# Patient Record
Sex: FEMALE | Race: BLACK OR AFRICAN AMERICAN | NOT HISPANIC OR LATINO | Employment: OTHER | URBAN - METROPOLITAN AREA
[De-identification: names, ages, dates, MRNs, and addresses within clinical notes are randomized per-mention and may not be internally consistent; named-entity substitution may affect disease eponyms.]

---

## 2017-07-27 ENCOUNTER — HISTORICAL (OUTPATIENT)
Dept: LAB | Facility: HOSPITAL | Age: 77
End: 2017-07-27

## 2017-07-31 ENCOUNTER — HISTORICAL (OUTPATIENT)
Dept: RADIOLOGY | Facility: HOSPITAL | Age: 77
End: 2017-07-31

## 2018-02-22 ENCOUNTER — HISTORICAL (OUTPATIENT)
Dept: LAB | Facility: HOSPITAL | Age: 78
End: 2018-02-22

## 2018-02-22 LAB
ABS NEUT (OLG): 5.8 X10(3)/MCL (ref 2.1–9.2)
ALBUMIN SERPL-MCNC: 3.6 GM/DL (ref 3.4–5)
ALBUMIN/GLOB SERPL: 0.9 RATIO (ref 1.1–2)
ALP SERPL-CCNC: 83 UNIT/L (ref 46–116)
ALT SERPL-CCNC: 12 UNIT/L (ref 12–78)
AST SERPL-CCNC: 13 UNIT/L (ref 15–37)
BASOPHILS NFR BLD AUTO: 0 % (ref 0–2)
BILIRUB SERPL-MCNC: 0.4 MG/DL (ref 0.2–1)
BILIRUBIN DIRECT+TOT PNL SERPL-MCNC: 0.11 MG/DL (ref 0–0.2)
BILIRUBIN DIRECT+TOT PNL SERPL-MCNC: 0.28 MG/DL (ref 0–0.8)
BUN SERPL-MCNC: 21.7 MG/DL (ref 7–18)
CALCIUM SERPL-MCNC: 9.3 MG/DL (ref 8.5–10.1)
CHLORIDE SERPL-SCNC: 103 MMOL/L (ref 98–107)
CHOLEST SERPL-MCNC: 156 MG/DL (ref 0–200)
CHOLEST/HDLC SERPL: 2.1 {RATIO} (ref 0–4)
CO2 SERPL-SCNC: 31.1 MMOL/L (ref 21–32)
CREAT SERPL-MCNC: 1 MG/DL (ref 0.6–1.3)
EOSINOPHIL # BLD AUTO: 0.2 X10(3)/MCL
EOSINOPHIL NFR BLD AUTO: 2 %
ERYTHROCYTE [DISTWIDTH] IN BLOOD BY AUTOMATED COUNT: 15.2 % (ref 11.5–17)
EST. AVERAGE GLUCOSE BLD GHB EST-MCNC: 128 MG/DL
GLOBULIN SER-MCNC: 4 GM/DL (ref 2.4–3.5)
GLUCOSE SERPL-MCNC: 89 MG/DL (ref 74–106)
HBA1C MFR BLD: 6.1 % (ref 4.5–6.2)
HCT VFR BLD AUTO: 32.4 % (ref 37–47)
HDLC SERPL-MCNC: 75 MG/DL (ref 40–60)
HGB BLD-MCNC: 10.4 GM/DL (ref 12–16)
LDLC SERPL CALC-MCNC: 72 MG/DL (ref 0–129)
LYMPHOCYTES # BLD AUTO: 2.3 X10(3)/MCL
LYMPHOCYTES NFR BLD AUTO: 25 % (ref 13–40)
MCH RBC QN AUTO: 29.6 PG (ref 27–31)
MCHC RBC AUTO-ENTMCNC: 32 GM/DL (ref 33–36)
MCV RBC AUTO: 92.3 FL (ref 80–94)
MONOCYTES # BLD AUTO: 0.8 X10(3)/MCL
MONOCYTES NFR BLD AUTO: 9 % (ref 2–11)
NEUTROPHILS # BLD AUTO: 5.8 X10(3)/MCL (ref 2.1–9.2)
NEUTROPHILS NFR BLD AUTO: 64 % (ref 47–80)
PLATELET # BLD AUTO: 274 X10(3)/MCL (ref 130–400)
PMV BLD AUTO: 8.9 FL (ref 7.4–10.4)
POTASSIUM SERPL-SCNC: 4.2 MMOL/L (ref 3.5–5.1)
PROT SERPL-MCNC: 7.6 GM/DL (ref 6.4–8.2)
RBC # BLD AUTO: 3.5 X10(6)/MCL (ref 4.2–5.4)
SODIUM SERPL-SCNC: 141 MMOL/L (ref 136–145)
TRIGL SERPL-MCNC: 47 MG/DL
VLDLC SERPL CALC-MCNC: 9 MG/DL
WBC # SPEC AUTO: 9.2 X10(3)/MCL (ref 4.5–11.5)

## 2018-08-22 ENCOUNTER — HISTORICAL (OUTPATIENT)
Dept: LAB | Facility: HOSPITAL | Age: 78
End: 2018-08-22

## 2018-08-22 LAB
ABS NEUT (OLG): 4.42 X10(3)/MCL (ref 2.1–9.2)
ALBUMIN SERPL-MCNC: 3.6 GM/DL (ref 3.4–5)
ALBUMIN/GLOB SERPL: 0.9 RATIO (ref 1.1–2)
ALP SERPL-CCNC: 73 UNIT/L (ref 46–116)
ALT SERPL-CCNC: 14 UNIT/L (ref 12–78)
APPEARANCE, UA: CLEAR
AST SERPL-CCNC: 13 UNIT/L (ref 15–37)
BACTERIA SPEC CULT: ABNORMAL
BASOPHILS # BLD AUTO: 0 X10(3)/MCL (ref 0–0.2)
BASOPHILS NFR BLD AUTO: 0 %
BILIRUB SERPL-MCNC: 0.2 MG/DL (ref 0.2–1)
BILIRUB UR QL STRIP: NEGATIVE
BILIRUBIN DIRECT+TOT PNL SERPL-MCNC: 0.07 MG/DL (ref 0–0.2)
BILIRUBIN DIRECT+TOT PNL SERPL-MCNC: 0.13 MG/DL (ref 0–0.8)
BUN SERPL-MCNC: 19.7 MG/DL (ref 7–18)
CALCIUM SERPL-MCNC: 8.6 MG/DL (ref 8.5–10.1)
CHLORIDE SERPL-SCNC: 105 MMOL/L (ref 98–107)
CO2 SERPL-SCNC: 23.1 MMOL/L (ref 21–32)
COLOR UR: YELLOW
CREAT SERPL-MCNC: 0.97 MG/DL (ref 0.6–1.3)
CREAT UR-MCNC: 68.9 MG/DL (ref 30–125)
EOSINOPHIL # BLD AUTO: 0.2 X10(3)/MCL (ref 0–0.9)
EOSINOPHIL NFR BLD AUTO: 3 %
ERYTHROCYTE [DISTWIDTH] IN BLOOD BY AUTOMATED COUNT: 13.8 % (ref 11.5–17)
EST. AVERAGE GLUCOSE BLD GHB EST-MCNC: 131 MG/DL
GLOBULIN SER-MCNC: 3.8 GM/DL (ref 2.4–3.5)
GLUCOSE (UA): NEGATIVE
GLUCOSE SERPL-MCNC: 82 MG/DL (ref 74–106)
HBA1C MFR BLD: 6.2 % (ref 4.5–6.2)
HCT VFR BLD AUTO: 31.8 % (ref 37–47)
HGB BLD-MCNC: 9.8 GM/DL (ref 12–16)
HGB UR QL STRIP: NEGATIVE
IMM GRANULOCYTES # BLD AUTO: 0.01 % (ref 0–0.02)
IMM GRANULOCYTES NFR BLD AUTO: 0.1 % (ref 0–0.43)
IRON SATN MFR SERPL: 15.6 % (ref 20–50)
IRON SERPL-MCNC: 42 MCG/DL (ref 50–175)
KETONES UR QL STRIP: NEGATIVE
LEUKOCYTE ESTERASE UR QL STRIP: ABNORMAL
LYMPHOCYTES # BLD AUTO: 2.6 X10(3)/MCL (ref 0.6–4.6)
LYMPHOCYTES NFR BLD AUTO: 32 %
MCH RBC QN AUTO: 29.3 PG (ref 27–31)
MCHC RBC AUTO-ENTMCNC: 30.8 GM/DL (ref 33–36)
MCV RBC AUTO: 95.2 FL (ref 80–94)
MICROALBUMIN UR-MCNC: 0.2 MG/DL (ref 0–3)
MICROALBUMIN/CREAT RATIO PNL UR: 2.9 MG/GM CR (ref 0–30)
MONOCYTES # BLD AUTO: 0.7 X10(3)/MCL (ref 0.1–1.3)
MONOCYTES NFR BLD AUTO: 9 %
NEUTROPHILS # BLD AUTO: 4.42 X10(3)/MCL (ref 1.4–7.9)
NEUTROPHILS NFR BLD AUTO: 55 %
NITRITE UR QL STRIP: NEGATIVE
PH UR STRIP: 5.5 [PH] (ref 5–9)
PLATELET # BLD AUTO: 286 X10(3)/MCL (ref 130–400)
PMV BLD AUTO: 10.6 FL (ref 9.4–12.4)
POTASSIUM SERPL-SCNC: 4.2 MMOL/L (ref 3.5–5.1)
PROT SERPL-MCNC: 7.4 GM/DL (ref 6.4–8.2)
PROT UR QL STRIP: NEGATIVE
RBC # BLD AUTO: 3.34 X10(6)/MCL (ref 4.2–5.4)
RBC #/AREA URNS HPF: ABNORMAL /[HPF]
SODIUM SERPL-SCNC: 143 MMOL/L (ref 136–145)
SP GR UR STRIP: 1.01 (ref 1–1.03)
SQUAMOUS EPITHELIAL, UA: ABNORMAL
TIBC SERPL-MCNC: 269 MCG/DL (ref 250–450)
TRANSFERRIN SERPL-MCNC: 210 MG/DL (ref 200–360)
UROBILINOGEN UR STRIP-ACNC: 0.2
WBC # SPEC AUTO: 8 X10(3)/MCL (ref 4.5–11.5)
WBC #/AREA URNS HPF: ABNORMAL /HPF

## 2019-01-09 ENCOUNTER — HISTORICAL (OUTPATIENT)
Dept: RADIOLOGY | Facility: HOSPITAL | Age: 79
End: 2019-01-09

## 2019-02-21 ENCOUNTER — HISTORICAL (OUTPATIENT)
Dept: RADIOLOGY | Facility: HOSPITAL | Age: 79
End: 2019-02-21

## 2020-02-05 ENCOUNTER — HISTORICAL (OUTPATIENT)
Dept: LAB | Facility: HOSPITAL | Age: 80
End: 2020-02-05

## 2022-04-07 ENCOUNTER — HISTORICAL (OUTPATIENT)
Dept: ADMINISTRATIVE | Facility: HOSPITAL | Age: 82
End: 2022-04-07

## 2022-04-23 VITALS
DIASTOLIC BLOOD PRESSURE: 60 MMHG | HEIGHT: 65 IN | BODY MASS INDEX: 26.99 KG/M2 | WEIGHT: 162 LBS | SYSTOLIC BLOOD PRESSURE: 138 MMHG

## 2022-08-30 ENCOUNTER — TELEPHONE (OUTPATIENT)
Dept: PRIMARY CARE CLINIC | Facility: CLINIC | Age: 82
End: 2022-08-30
Payer: MEDICARE

## 2022-08-30 RX ORDER — HYDRALAZINE HYDROCHLORIDE 50 MG/1
50 TABLET, FILM COATED ORAL 3 TIMES DAILY
COMMUNITY
Start: 2022-08-28 | End: 2022-10-18 | Stop reason: DRUGHIGH

## 2022-08-30 RX ORDER — CARVEDILOL 12.5 MG/1
12.5 TABLET ORAL 2 TIMES DAILY
COMMUNITY
Start: 2022-08-16 | End: 2022-10-18 | Stop reason: SDUPTHER

## 2022-08-30 RX ORDER — VALSARTAN AND HYDROCHLOROTHIAZIDE 160; 12.5 MG/1; MG/1
1 TABLET, FILM COATED ORAL DAILY
COMMUNITY
Start: 2022-08-16 | End: 2022-10-18 | Stop reason: SDUPTHER

## 2022-08-30 NOTE — TELEPHONE ENCOUNTER
----- Message from Valery Urias sent at 8/30/2022 11:43 AM CDT -----  Regarding: Med Advice  Type:  Needs Medical Advice    Who Called: pt  Symptoms (please be specific): fatigue, no energy at all ... different bp readings .. numbest in hands  How long has patient had these symptoms: 4 days   Pharmacy name and phone #:  [facility]   Would the patient rather a call back or a response via [facility]? Call back  Best Call Back Number: [phone]  Additional Information: pt wants medical advice about the bp medication (Valsartan) that she is on.. says the pharmacy was out of the one that she normally takes but says it's the same she is currently still taking just shaped different ... pt doesn't feel well after starting this one .. pt is concerned after being told she is dying from someone    Says her bp went from 108 to 145 and even as high as 199

## 2022-08-30 NOTE — TELEPHONE ENCOUNTER
They often change shape without it meaning anything other than there is a different . Unfortunately going from one generic to another can be a big difference since they are allowed to be 10 % less or 10% stronger and she may have gone from one that was stronger to one that is now weaker.   If BP is still high she could take an extra Coreg, she is not on the maximal dose of it.   Rest today, recheck BP tomorrow, if still an issue we will see if we can get the name brand with a PA.

## 2022-09-08 ENCOUNTER — TELEPHONE (OUTPATIENT)
Dept: PRIMARY CARE CLINIC | Facility: CLINIC | Age: 82
End: 2022-09-08
Payer: MEDICARE

## 2022-09-08 NOTE — TELEPHONE ENCOUNTER
----- Message from Valery Bridgett sent at 9/8/2022  3:27 PM CDT -----  Regarding: Call Back  Type:  Patient Returning Call    Who Called: pt  Who Left Message for Patient: for nurse  Does the patient know what this is regarding?: yes  Would the patient rather a call back or a response via [facility]? Call back  Best Call Back Number: [phone]  Additional Information: pt wants to know if she can take her medication in the morning, instead of at night.. if not she also wants to know why .. she says the medication keeps her in the bathroom and breaks her sleep continuously due to this .. she also wanted to let  know her bp is low around 118/69 .. also when she wakes up she doesn't feel like herself

## 2022-10-18 ENCOUNTER — OFFICE VISIT (OUTPATIENT)
Dept: PRIMARY CARE CLINIC | Facility: CLINIC | Age: 82
End: 2022-10-18
Payer: MEDICARE

## 2022-10-18 VITALS
WEIGHT: 145 LBS | SYSTOLIC BLOOD PRESSURE: 118 MMHG | DIASTOLIC BLOOD PRESSURE: 78 MMHG | BODY MASS INDEX: 24.13 KG/M2 | TEMPERATURE: 97 F | RESPIRATION RATE: 16 BRPM | OXYGEN SATURATION: 98 % | HEART RATE: 67 BPM

## 2022-10-18 DIAGNOSIS — I10 ESSENTIAL HYPERTENSION: ICD-10-CM

## 2022-10-18 DIAGNOSIS — D50.0 IRON DEFICIENCY ANEMIA DUE TO CHRONIC BLOOD LOSS: ICD-10-CM

## 2022-10-18 DIAGNOSIS — E11.9 WELL CONTROLLED TYPE 2 DIABETES MELLITUS: Primary | ICD-10-CM

## 2022-10-18 DIAGNOSIS — Z23 NEED FOR INFLUENZA VACCINATION: ICD-10-CM

## 2022-10-18 PROBLEM — G56.00 CARPAL TUNNEL SYNDROME: Status: ACTIVE | Noted: 2022-10-18

## 2022-10-18 PROBLEM — N17.9 ACUTE RENAL FAILURE: Status: ACTIVE | Noted: 2022-10-18

## 2022-10-18 PROBLEM — K57.90 DIVERTICULAR DISEASE: Status: ACTIVE | Noted: 2022-10-18

## 2022-10-18 PROBLEM — D53.9 ANEMIA, MACROCYTIC: Status: ACTIVE | Noted: 2022-10-18

## 2022-10-18 PROBLEM — I25.10 ATHEROSCLEROSIS OF NATIVE CORONARY ARTERY OF NATIVE HEART WITHOUT ANGINA PECTORIS: Status: ACTIVE | Noted: 2022-10-18

## 2022-10-18 PROBLEM — D50.9 IRON DEFICIENCY ANEMIA: Status: ACTIVE | Noted: 2022-10-18

## 2022-10-18 PROBLEM — G56.20 ULNAR NERVE ENTRAPMENT: Status: ACTIVE | Noted: 2022-10-18

## 2022-10-18 LAB
ANION GAP SERPL CALC-SCNC: 11 MEQ/L
BUN SERPL-MCNC: 16.8 MG/DL (ref 9.8–20.1)
CALCIUM SERPL-MCNC: 8.9 MG/DL (ref 8.4–10.2)
CHLORIDE SERPL-SCNC: 104 MMOL/L (ref 98–107)
CO2 SERPL-SCNC: 24 MMOL/L (ref 23–31)
CREAT SERPL-MCNC: 0.86 MG/DL (ref 0.55–1.02)
CREAT/UREA NIT SERPL: 20
GFR SERPLBLD CREATININE-BSD FMLA CKD-EPI: >60 MLS/MIN/1.73/M2
GLUCOSE SERPL-MCNC: 80 MG/DL (ref 82–115)
POTASSIUM SERPL-SCNC: 4.3 MMOL/L (ref 3.5–5.1)
SODIUM SERPL-SCNC: 139 MMOL/L (ref 136–145)

## 2022-10-18 PROCEDURE — 36415 COLL VENOUS BLD VENIPUNCTURE: CPT | Mod: ,,, | Performed by: INTERNAL MEDICINE

## 2022-10-18 PROCEDURE — 1126F AMNT PAIN NOTED NONE PRSNT: CPT | Mod: CPTII,,, | Performed by: INTERNAL MEDICINE

## 2022-10-18 PROCEDURE — 3078F DIAST BP <80 MM HG: CPT | Mod: CPTII,,, | Performed by: INTERNAL MEDICINE

## 2022-10-18 PROCEDURE — 90694 FLU VACCINE - QUADRIVALENT - ADJUVANTED: ICD-10-PCS | Mod: ,,, | Performed by: INTERNAL MEDICINE

## 2022-10-18 PROCEDURE — 80048 BASIC METABOLIC PNL TOTAL CA: CPT | Performed by: INTERNAL MEDICINE

## 2022-10-18 PROCEDURE — 99213 OFFICE O/P EST LOW 20 MIN: CPT | Mod: 25,,, | Performed by: INTERNAL MEDICINE

## 2022-10-18 PROCEDURE — G0008 ADMIN INFLUENZA VIRUS VAC: HCPCS | Mod: ,,, | Performed by: INTERNAL MEDICINE

## 2022-10-18 PROCEDURE — G0008 FLU VACCINE - QUADRIVALENT - ADJUVANTED: ICD-10-PCS | Mod: ,,, | Performed by: INTERNAL MEDICINE

## 2022-10-18 PROCEDURE — 1160F RVW MEDS BY RX/DR IN RCRD: CPT | Mod: CPTII,,, | Performed by: INTERNAL MEDICINE

## 2022-10-18 PROCEDURE — 3074F SYST BP LT 130 MM HG: CPT | Mod: CPTII,,, | Performed by: INTERNAL MEDICINE

## 2022-10-18 PROCEDURE — 1126F PR PAIN SEVERITY QUANTIFIED, NO PAIN PRESENT: ICD-10-PCS | Mod: CPTII,,, | Performed by: INTERNAL MEDICINE

## 2022-10-18 PROCEDURE — 36415 PR COLLECTION VENOUS BLOOD,VENIPUNCTURE: ICD-10-PCS | Mod: ,,, | Performed by: INTERNAL MEDICINE

## 2022-10-18 PROCEDURE — 90694 VACC AIIV4 NO PRSRV 0.5ML IM: CPT | Mod: ,,, | Performed by: INTERNAL MEDICINE

## 2022-10-18 PROCEDURE — 99213 PR OFFICE/OUTPT VISIT, EST, LEVL III, 20-29 MIN: ICD-10-PCS | Mod: 25,,, | Performed by: INTERNAL MEDICINE

## 2022-10-18 PROCEDURE — 36415 COLL VENOUS BLD VENIPUNCTURE: CPT | Performed by: INTERNAL MEDICINE

## 2022-10-18 PROCEDURE — 1159F MED LIST DOCD IN RCRD: CPT | Mod: CPTII,,, | Performed by: INTERNAL MEDICINE

## 2022-10-18 PROCEDURE — 1159F PR MEDICATION LIST DOCUMENTED IN MEDICAL RECORD: ICD-10-PCS | Mod: CPTII,,, | Performed by: INTERNAL MEDICINE

## 2022-10-18 PROCEDURE — 3074F PR MOST RECENT SYSTOLIC BLOOD PRESSURE < 130 MM HG: ICD-10-PCS | Mod: CPTII,,, | Performed by: INTERNAL MEDICINE

## 2022-10-18 PROCEDURE — 1160F PR REVIEW ALL MEDS BY PRESCRIBER/CLIN PHARMACIST DOCUMENTED: ICD-10-PCS | Mod: CPTII,,, | Performed by: INTERNAL MEDICINE

## 2022-10-18 PROCEDURE — 3078F PR MOST RECENT DIASTOLIC BLOOD PRESSURE < 80 MM HG: ICD-10-PCS | Mod: CPTII,,, | Performed by: INTERNAL MEDICINE

## 2022-10-18 RX ORDER — METFORMIN HYDROCHLORIDE 500 MG/1
500 TABLET ORAL 2 TIMES DAILY WITH MEALS
Qty: 180 TABLET | Refills: 1 | Status: SHIPPED | OUTPATIENT
Start: 2022-10-18 | End: 2023-04-18 | Stop reason: SDUPTHER

## 2022-10-18 RX ORDER — CLOPIDOGREL BISULFATE 75 MG/1
75 TABLET ORAL DAILY
Qty: 90 TABLET | Refills: 1 | Status: SHIPPED | OUTPATIENT
Start: 2022-10-18 | End: 2023-04-18 | Stop reason: SDUPTHER

## 2022-10-18 RX ORDER — HYDRALAZINE HYDROCHLORIDE 50 MG/1
50 TABLET, FILM COATED ORAL 3 TIMES DAILY
Start: 2022-10-18 | End: 2023-04-18

## 2022-10-18 RX ORDER — VALSARTAN AND HYDROCHLOROTHIAZIDE 160; 12.5 MG/1; MG/1
1 TABLET, FILM COATED ORAL DAILY
Qty: 90 TABLET | Refills: 1 | Status: SHIPPED | OUTPATIENT
Start: 2022-10-18 | End: 2022-12-07

## 2022-10-18 RX ORDER — HYDRALAZINE HYDROCHLORIDE 25 MG/1
25 TABLET, FILM COATED ORAL 3 TIMES DAILY
COMMUNITY
Start: 2022-08-31

## 2022-10-18 RX ORDER — CARVEDILOL 12.5 MG/1
12.5 TABLET ORAL 2 TIMES DAILY
Qty: 180 TABLET | Refills: 1 | Status: SHIPPED | OUTPATIENT
Start: 2022-10-18 | End: 2023-04-18 | Stop reason: SDUPTHER

## 2022-10-18 RX ORDER — AMLODIPINE BESYLATE 10 MG/1
10 TABLET ORAL DAILY
Qty: 90 TABLET | Refills: 1 | Status: SHIPPED | OUTPATIENT
Start: 2022-10-18 | End: 2023-04-18 | Stop reason: SDUPTHER

## 2022-10-18 RX ORDER — SIMVASTATIN 20 MG/1
20 TABLET, FILM COATED ORAL NIGHTLY
Qty: 90 TABLET | Refills: 1 | Status: SHIPPED | OUTPATIENT
Start: 2022-10-18 | End: 2023-04-18 | Stop reason: SDUPTHER

## 2022-10-18 NOTE — PROGRESS NOTES
Idania Phan MD   [address] [facility] [place] [address]     PATIENT NAME: Lynda Mendoza  : 1940  DATE: 10/18/22  MRN: [id number]      Billing Provider: Idania Phan MD  Level of Service: ND OFFICE/OUTPT VISIT, EST, LEVL III, 20-29 MIN  Patient PCP Information       Provider PCP Type    Idania Phan MD General            Reason for Visit / Chief Complaint: 6 months follow up       Update PCP  Update Chief Complaint         History of Present Illness / Problem Focused Workflow     Lynda Mendoza presents to the clinic with 6 months follow up     82 year old AAF followed for HTN, DM, CHF, iron deficiency and CAD. She is here for follow up. She is still walking but she isn't as active as she used to. She didn't do the camp at summer and she feels bad about not being of value to society.   DM brought logs, she has been doing good, occasionally goes over 200 but she does when she is off diet. Her foot exam and microalbumin were 22  She has still gotten issues with urinating all night.         Review of Systems     Review of Systems   Constitutional:  Positive for activity change. Negative for fatigue.   HENT: Negative.     Respiratory: Negative.     Cardiovascular: Negative.    Gastrointestinal: Negative.    Genitourinary:  Positive for frequency.        Worst at night, she goes every 2 hours.    Musculoskeletal:  Positive for arthralgias.   Skin: Negative.    Neurological: Negative.    Psychiatric/Behavioral:          Feels down that she didn't go do the camp this summer.  She didn't like how the people were doing this year.     Medical / Social / Family History     Past Medical History:   Diagnosis Date    Acute renal failure     CAD (coronary artery disease)     Diabetes mellitus, type 2     Diverticulosis     HTN (hypertension)     Iron deficiency     Macrocytic anemia     MI (myocardial infarction)        Past Surgical History:   Procedure Laterality Date    BREAST LUMPECTOMY Left     NASAL SEPTOPLASTY       SKIN TAG REMOVAL      SURGICAL REMOVAL OF NODULE OF VOCAL CORD         Social History  Ms. Mendoza      reports that she has never smoked. She has never used smokeless tobacco. She reports that she does not currently use alcohol. She reports that she does not use drugs.    Family History  Ms.'s Mendoza   family history includes Dementia in her sister; Heart disease in her father; Heart failure in her sister.    Medications and Allergies     Medications  Outpatient Medications Marked as Taking for the 10/18/22 encounter (Office Visit) with Idania Phan MD   Medication Sig Dispense Refill    hydrALAZINE (APRESOLINE) 25 MG tablet Take 25 mg by mouth 3 (three) times daily. Taking 75 mg tid      [DISCONTINUED] amLODIPine (NORVASC) 10 MG tablet TAKE ONE TABLET BY MOUTH EVERY DAY FOR BLOOD PRESSURE NORVASC GENERIC NRX IN FILE 4/28/22 30 tablet 8    [DISCONTINUED] carvediloL (COREG) 12.5 MG tablet Take 12.5 mg by mouth 2 (two) times daily.      [DISCONTINUED] clopidogreL (PLAVIX) 75 mg tablet TAKE ONE TABLET BY MOUTH EVERY DAY FOR BLOOD GENERIC PLAVIX 30 tablet 5    [DISCONTINUED] metFORMIN (GLUCOPHAGE) 500 MG tablet TAKE ONE TABLET BY MOUTH TWICE A DAY FOR DIABETES NRX IN FILE 4/28/22 60 tablet 5    [DISCONTINUED] simvastatin (ZOCOR) 20 MG tablet TAKE ONE TABLET BY MOUTH AT BEDTIME FOR CHOLESTEROL GENERIC ZOCOR NRX IN FILE 4/28/22 30 tablet 5    [DISCONTINUED] valsartan-hydrochlorothiazide (DIOVAN-HCT) 160-12.5 mg per tablet Take 1 tablet by mouth once daily.         Allergies  Review of patient's allergies indicates:   Allergen Reactions    Penicillins        Physical Examination     Vitals:    10/18/22 1153   BP: 118/78   Pulse:    Resp:    Temp:      Physical Exam  Vitals reviewed.   Constitutional:       Appearance: Normal appearance.   HENT:      Head: Normocephalic and atraumatic.      Mouth/Throat:      Mouth: Mucous membranes are moist.      Pharynx: No oropharyngeal exudate or posterior oropharyngeal erythema.    Eyes:      Extraocular Movements: Extraocular movements intact.   Cardiovascular:      Rate and Rhythm: Normal rate and regular rhythm.      Heart sounds: Murmur heard.     Gallop present.   Pulmonary:      Effort: Pulmonary effort is normal.      Breath sounds: Normal breath sounds.   Abdominal:      Palpations: Abdomen is soft.      Tenderness: There is no abdominal tenderness. There is no guarding.   Musculoskeletal:         General: No tenderness.   Skin:     General: Skin is warm and dry.   Neurological:      Mental Status: She is alert.   Psychiatric:         Mood and Affect: Mood normal.         Behavior: Behavior normal.         Thought Content: Thought content normal.         Judgment: Judgment normal.         Assessment and Plan (including Health Maintenance)      Problem List  Smart Bunker Mode  Document Outside HM   :    Plan:   DM good control, full lab due in April with foot exam  HTN she says she is taking hydralazine 75mg TID still [facility] It's not in her bottles but her BP is good so probably is.   CAD/CHF keep follow up with Dr Moe.   Flu shot today.      Health Maintenance Due   Topic Date Due    TETANUS VACCINE  Never done    DEXA Scan  Never done    Shingles Vaccine (1 of 2) Never done    COVID-19 Vaccine (3 - Booster for Pfizer series) 05/20/2021    Pneumococcal Vaccines (Age 65+) (2 - PPSV23 if available, else PCV20) 12/17/2021    Influenza Vaccine (1) Never done   Diabetes not showing, microalbumin and foot exam as well as A1C done in April did not cross.     Problem List Items Addressed This Visit          Cardiac/Vascular    Essential hypertension    Relevant Orders    Basic Metabolic Panel       Oncology    Iron deficiency anemia       Endocrine    Well controlled type 2 diabetes mellitus - Primary    Relevant Medications    metFORMIN (GLUCOPHAGE) 500 MG tablet    Other Relevant Orders    Basic Metabolic Panel    Hemoglobin A1C     Other Visit Diagnoses       Need for  influenza vaccination        Relevant Orders    Influenza (FLUAD) - Quadrivalent (Adjuvanted) *Preferred* (65+) (PF)            Health Maintenance Topics with due status: Not Due       Topic Last Completion Date    Lipid Panel 04/28/2022       No future appointments. 6mo annual           Signature:  Idania Phan MD  [facility]  [id number] [address]    Date of encounter: 10/18/22

## 2022-10-25 ENCOUNTER — TELEPHONE (OUTPATIENT)
Dept: PRIMARY CARE CLINIC | Facility: CLINIC | Age: 82
End: 2022-10-25
Payer: MEDICARE

## 2022-10-25 NOTE — TELEPHONE ENCOUNTER
----- Message from Idania Phan MD sent at 10/19/2022  7:50 PM CDT -----  Sugar, kidney and electrolytes look great, next time we'll send her to the hospital to draw the blood so we get the A1C.

## 2022-12-07 DIAGNOSIS — D50.0 IRON DEFICIENCY ANEMIA DUE TO CHRONIC BLOOD LOSS: Primary | ICD-10-CM

## 2022-12-07 RX ORDER — VALSARTAN AND HYDROCHLOROTHIAZIDE 160; 12.5 MG/1; MG/1
TABLET, FILM COATED ORAL
Qty: 30 TABLET | Refills: 5 | Status: SHIPPED | OUTPATIENT
Start: 2022-12-07 | End: 2023-04-18 | Stop reason: DRUGHIGH

## 2023-01-23 PROBLEM — N17.9 ACUTE RENAL FAILURE: Status: RESOLVED | Noted: 2022-10-18 | Resolved: 2023-01-23

## 2023-02-14 ENCOUNTER — TELEPHONE (OUTPATIENT)
Dept: PRIMARY CARE CLINIC | Facility: CLINIC | Age: 83
End: 2023-02-14
Payer: MEDICARE

## 2023-02-14 NOTE — TELEPHONE ENCOUNTER
----- Message from Minna London sent at 2/14/2023 10:53 AM CST -----  Regarding: thank you!!!!!  Type:  Needs Medical Advice    Who Called: pt  Additional Information:   Patient called to Thank Dr Phan for her professional care all these years.  She also said Thank you to all the employees in the clinic and wishes everyone a Happy Carrillo's Day!

## 2023-04-04 ENCOUNTER — TELEPHONE (OUTPATIENT)
Dept: PRIMARY CARE CLINIC | Facility: CLINIC | Age: 83
End: 2023-04-04
Payer: MEDICARE

## 2023-04-17 PROBLEM — I77.9 DISORDER OF ARTERIES AND ARTERIOLES, UNSPECIFIED: Status: ACTIVE | Noted: 2023-04-17

## 2023-04-17 NOTE — PROGRESS NOTES
Patient ID: [id number]     Chief Complaint: No chief complaint on file.      HPI:     Lynda Mendoza is a 82 y.o. female here today for a Medicare Wellness.       Opioid Screening: Patient medication list reviewed, patient {IS / IS NOT:86650} taking prescription opioids. Patient {IS / IS NOT:92831} using additional opioids than prescribed. Patient {IS / IS NOT:51375} at low risk of substance abuse based on this opioid use history.       Past Medical History:   Diagnosis Date    Acute renal failure     CAD (coronary artery disease)     Diabetes mellitus, type 2     Diverticulosis     HTN (hypertension)     Iron deficiency     Macrocytic anemia     MI (myocardial infarction)         Past Surgical History:   Procedure Laterality Date    BREAST LUMPECTOMY Left     NASAL SEPTOPLASTY      SKIN TAG REMOVAL      SURGICAL REMOVAL OF NODULE OF VOCAL CORD         Review of patient's allergies indicates:   Allergen Reactions    Penicillins        No outpatient medications have been marked as taking for the 4/18/23 encounter (Appointment) with Idania Phan MD.       Social History     Socioeconomic History    Marital status:    Tobacco Use    Smoking status: Never    Smokeless tobacco: Never   Substance and Sexual Activity    Alcohol use: Not Currently    Drug use: Never        Family History   Problem Relation Age of Onset    Heart disease Father     Dementia Sister     Heart failure Sister         Patient Care Team:  Idania Phan MD as PCP - General (Internal Medicine)       Subjective:     ROS      Patient Reported Health Risk Assessment       Objective:     There were no vitals taken for this visit.    Physical Exam      No flowsheet data found.  No flowsheet data found.           Office Visit on 10/18/2022   Component Date Value    Sodium Level 10/18/2022 139     Potassium Level 10/18/2022 4.3     Chloride 10/18/2022 104     Carbon Dioxide 10/18/2022 24     Glucose Level 10/18/2022 80 (L)     Blood Urea  Nitrogen 10/18/2022 16.8     Creatinine 10/18/2022 0.86     BUN/Creatinine Ratio 10/18/2022 20     Calcium Level Total 10/18/2022 8.9     Anion Gap 10/18/2022 11.0     eGFR 10/18/2022 >60      Lab Results   Component Value Date    HGBA1C 6.2 08/22/2018     A1C in [facility] 4/28/22 was 6.6  Assessment/Plan:     1. Medicare annual wellness visit, subsequent    2. Essential hypertension    3. Well controlled type 2 diabetes mellitus    4. Anemia, macrocytic    5. Iron deficiency anemia due to chronic blood loss    6. Disorder of arteries and arterioles, unspecified           Medicare Annual Wellness and Personalized Prevention Plan:   Fall Risk + Home Safety + Hearing Impairment + Depression Screen + Opioid and Substance Abuse Screening + Cognitive Impairment Screen + Health Risk Assessment all reviewed.     Health Maintenance Topics with due status: Not Due       Topic Last Completion Date    Lipid Panel 04/28/2022      The patient's Health Maintenance was reviewed and the following appears to be due at this time:   Health Maintenance Due   Topic Date Due    TETANUS VACCINE  Never done    DEXA Scan  Never done    Shingles Vaccine (1 of 2) Never done    COVID-19 Vaccine (3 - Booster for Pfizer series) 05/20/2021    Pneumococcal Vaccines (Age 65+) (2 - PPSV23 if available, else PCV20) 12/17/2021       Advance Care Planning   I attest to discussing Advance Care Planning with patient and/or family member.  Education was provided including the importance of the Health Care Power of , Advance Directives, and/or [place] documentation.  The patient expressed understanding to the importance of this information and discussion.         No follow-ups on file. In addition to their scheduled follow up, the patient has also been instructed to follow up on as needed basis.

## 2023-04-18 ENCOUNTER — OFFICE VISIT (OUTPATIENT)
Dept: PRIMARY CARE CLINIC | Facility: CLINIC | Age: 83
End: 2023-04-18
Payer: MEDICARE

## 2023-04-18 ENCOUNTER — TELEPHONE (OUTPATIENT)
Dept: PRIMARY CARE CLINIC | Facility: CLINIC | Age: 83
End: 2023-04-18

## 2023-04-18 VITALS
SYSTOLIC BLOOD PRESSURE: 130 MMHG | TEMPERATURE: 98 F | HEART RATE: 65 BPM | DIASTOLIC BLOOD PRESSURE: 66 MMHG | WEIGHT: 154 LBS | OXYGEN SATURATION: 97 % | BODY MASS INDEX: 25.66 KG/M2 | HEIGHT: 65 IN | RESPIRATION RATE: 16 BRPM

## 2023-04-18 DIAGNOSIS — I77.9 DISORDER OF ARTERIES AND ARTERIOLES, UNSPECIFIED: ICD-10-CM

## 2023-04-18 DIAGNOSIS — D50.0 IRON DEFICIENCY ANEMIA DUE TO CHRONIC BLOOD LOSS: ICD-10-CM

## 2023-04-18 DIAGNOSIS — R94.6 ABNORMAL THYROID FUNCTION TEST: ICD-10-CM

## 2023-04-18 DIAGNOSIS — Z86.73 HISTORY OF ARTERIAL ISCHEMIC STROKE: ICD-10-CM

## 2023-04-18 DIAGNOSIS — E11.9 WELL CONTROLLED TYPE 2 DIABETES MELLITUS: ICD-10-CM

## 2023-04-18 DIAGNOSIS — I10 ESSENTIAL HYPERTENSION: Primary | ICD-10-CM

## 2023-04-18 DIAGNOSIS — I65.22 STENOSIS OF LEFT CAROTID ARTERY: ICD-10-CM

## 2023-04-18 DIAGNOSIS — E11.9 WELL CONTROLLED TYPE 2 DIABETES MELLITUS: Primary | ICD-10-CM

## 2023-04-18 LAB
ALBUMIN SERPL-MCNC: 3.5 G/DL (ref 3.4–4.8)
ALBUMIN/GLOB SERPL: 1 RATIO (ref 1.1–2)
ALP SERPL-CCNC: 72 UNIT/L (ref 40–150)
ALT SERPL-CCNC: 10 UNIT/L (ref 0–55)
AST SERPL-CCNC: 22 UNIT/L (ref 5–34)
BASOPHILS # BLD AUTO: 0.07 X10(3)/MCL (ref 0–0.2)
BASOPHILS NFR BLD AUTO: 1.4 %
BILIRUBIN DIRECT+TOT PNL SERPL-MCNC: 0.3 MG/DL
BUN SERPL-MCNC: 24.3 MG/DL (ref 9.8–20.1)
CALCIUM SERPL-MCNC: 8.8 MG/DL (ref 8.4–10.2)
CHLORIDE SERPL-SCNC: 106 MMOL/L (ref 98–107)
CO2 SERPL-SCNC: 25 MMOL/L (ref 23–31)
CREAT SERPL-MCNC: 1.08 MG/DL (ref 0.55–1.02)
EOSINOPHIL # BLD AUTO: 0.12 X10(3)/MCL (ref 0–0.9)
EOSINOPHIL NFR BLD AUTO: 2.3 %
ERYTHROCYTE [DISTWIDTH] IN BLOOD BY AUTOMATED COUNT: 16.1 % (ref 11.5–17)
GFR SERPLBLD CREATININE-BSD FMLA CKD-EPI: 51 MLS/MIN/1.73/M2
GLOBULIN SER-MCNC: 3.6 GM/DL (ref 2.4–3.5)
GLUCOSE SERPL-MCNC: 119 MG/DL (ref 82–115)
HCT VFR BLD AUTO: 25.7 % (ref 37–47)
HGB BLD-MCNC: 7.6 G/DL (ref 12–16)
IMM GRANULOCYTES # BLD AUTO: 0.01 X10(3)/MCL (ref 0–0.04)
IMM GRANULOCYTES NFR BLD AUTO: 0.2 %
IRON SATN MFR SERPL: 16 % (ref 20–50)
IRON SERPL-MCNC: 47 UG/DL (ref 50–170)
LYMPHOCYTES # BLD AUTO: 1.31 X10(3)/MCL (ref 0.6–4.6)
LYMPHOCYTES NFR BLD AUTO: 25.5 %
MCH RBC QN AUTO: 29.8 PG (ref 27–31)
MCHC RBC AUTO-ENTMCNC: 29.6 G/DL (ref 33–36)
MCV RBC AUTO: 100.8 FL (ref 80–94)
MONOCYTES # BLD AUTO: 0.49 X10(3)/MCL (ref 0.1–1.3)
MONOCYTES NFR BLD AUTO: 9.5 %
NEUTROPHILS # BLD AUTO: 3.14 X10(3)/MCL (ref 2.1–9.2)
NEUTROPHILS NFR BLD AUTO: 61.1 %
PLATELET # BLD AUTO: 225 X10(3)/MCL (ref 130–400)
PMV BLD AUTO: 11.3 FL (ref 7.4–10.4)
POTASSIUM SERPL-SCNC: 4.3 MMOL/L (ref 3.5–5.1)
PROT SERPL-MCNC: 7.1 GM/DL (ref 5.8–7.6)
RBC # BLD AUTO: 2.55 X10(6)/MCL (ref 4.2–5.4)
SODIUM SERPL-SCNC: 141 MMOL/L (ref 136–145)
TIBC SERPL-MCNC: 242 UG/DL (ref 70–310)
TIBC SERPL-MCNC: 289 UG/DL (ref 250–450)
TRANSFERRIN SERPL-MCNC: 266 MG/DL
TSH SERPL-ACNC: 1.79 UIU/ML (ref 0.35–4.94)
WBC # SPEC AUTO: 5.1 X10(3)/MCL (ref 4.5–11.5)

## 2023-04-18 PROCEDURE — 1101F PT FALLS ASSESS-DOCD LE1/YR: CPT | Mod: CPTII,,, | Performed by: INTERNAL MEDICINE

## 2023-04-18 PROCEDURE — 99214 OFFICE O/P EST MOD 30 MIN: CPT | Mod: ,,, | Performed by: INTERNAL MEDICINE

## 2023-04-18 PROCEDURE — 3078F PR MOST RECENT DIASTOLIC BLOOD PRESSURE < 80 MM HG: ICD-10-PCS | Mod: CPTII,,, | Performed by: INTERNAL MEDICINE

## 2023-04-18 PROCEDURE — 99214 PR OFFICE/OUTPT VISIT, EST, LEVL IV, 30-39 MIN: ICD-10-PCS | Mod: ,,, | Performed by: INTERNAL MEDICINE

## 2023-04-18 PROCEDURE — 80053 COMPREHEN METABOLIC PANEL: CPT | Performed by: INTERNAL MEDICINE

## 2023-04-18 PROCEDURE — 83550 IRON BINDING TEST: CPT | Performed by: INTERNAL MEDICINE

## 2023-04-18 PROCEDURE — 1101F PR PT FALLS ASSESS DOC 0-1 FALLS W/OUT INJ PAST YR: ICD-10-PCS | Mod: CPTII,,, | Performed by: INTERNAL MEDICINE

## 2023-04-18 PROCEDURE — 1126F AMNT PAIN NOTED NONE PRSNT: CPT | Mod: CPTII,,, | Performed by: INTERNAL MEDICINE

## 2023-04-18 PROCEDURE — 3078F DIAST BP <80 MM HG: CPT | Mod: CPTII,,, | Performed by: INTERNAL MEDICINE

## 2023-04-18 PROCEDURE — 84443 ASSAY THYROID STIM HORMONE: CPT | Performed by: INTERNAL MEDICINE

## 2023-04-18 PROCEDURE — 1126F PR PAIN SEVERITY QUANTIFIED, NO PAIN PRESENT: ICD-10-PCS | Mod: CPTII,,, | Performed by: INTERNAL MEDICINE

## 2023-04-18 PROCEDURE — 1160F PR REVIEW ALL MEDS BY PRESCRIBER/CLIN PHARMACIST DOCUMENTED: ICD-10-PCS | Mod: CPTII,,, | Performed by: INTERNAL MEDICINE

## 2023-04-18 PROCEDURE — 1160F RVW MEDS BY RX/DR IN RCRD: CPT | Mod: CPTII,,, | Performed by: INTERNAL MEDICINE

## 2023-04-18 PROCEDURE — 3075F PR MOST RECENT SYSTOLIC BLOOD PRESS GE 130-139MM HG: ICD-10-PCS | Mod: CPTII,,, | Performed by: INTERNAL MEDICINE

## 2023-04-18 PROCEDURE — 85025 COMPLETE CBC W/AUTO DIFF WBC: CPT | Performed by: INTERNAL MEDICINE

## 2023-04-18 PROCEDURE — 3075F SYST BP GE 130 - 139MM HG: CPT | Mod: CPTII,,, | Performed by: INTERNAL MEDICINE

## 2023-04-18 PROCEDURE — 1159F MED LIST DOCD IN RCRD: CPT | Mod: CPTII,,, | Performed by: INTERNAL MEDICINE

## 2023-04-18 PROCEDURE — 36415 PR COLLECTION VENOUS BLOOD,VENIPUNCTURE: ICD-10-PCS | Mod: ,,, | Performed by: INTERNAL MEDICINE

## 2023-04-18 PROCEDURE — 36415 COLL VENOUS BLD VENIPUNCTURE: CPT | Performed by: INTERNAL MEDICINE

## 2023-04-18 PROCEDURE — 3288F FALL RISK ASSESSMENT DOCD: CPT | Mod: CPTII,,, | Performed by: INTERNAL MEDICINE

## 2023-04-18 PROCEDURE — 3288F PR FALLS RISK ASSESSMENT DOCUMENTED: ICD-10-PCS | Mod: CPTII,,, | Performed by: INTERNAL MEDICINE

## 2023-04-18 PROCEDURE — 36415 COLL VENOUS BLD VENIPUNCTURE: CPT | Mod: ,,, | Performed by: INTERNAL MEDICINE

## 2023-04-18 PROCEDURE — 1159F PR MEDICATION LIST DOCUMENTED IN MEDICAL RECORD: ICD-10-PCS | Mod: CPTII,,, | Performed by: INTERNAL MEDICINE

## 2023-04-18 RX ORDER — AMLODIPINE BESYLATE 10 MG/1
10 TABLET ORAL DAILY
Qty: 90 TABLET | Refills: 1 | Status: SHIPPED | OUTPATIENT
Start: 2023-04-18 | End: 2023-10-31

## 2023-04-18 RX ORDER — METFORMIN HYDROCHLORIDE 500 MG/1
500 TABLET ORAL 2 TIMES DAILY WITH MEALS
Qty: 180 TABLET | Refills: 1 | Status: SHIPPED | OUTPATIENT
Start: 2023-04-18 | End: 2023-10-31

## 2023-04-18 RX ORDER — SIMVASTATIN 20 MG/1
20 TABLET, FILM COATED ORAL NIGHTLY
Qty: 90 TABLET | Refills: 1 | Status: SHIPPED | OUTPATIENT
Start: 2023-04-18 | End: 2023-10-31

## 2023-04-18 RX ORDER — VALSARTAN 160 MG/1
160 TABLET ORAL DAILY
Qty: 90 TABLET | Refills: 1 | Status: SHIPPED | OUTPATIENT
Start: 2023-04-18 | End: 2023-11-30

## 2023-04-18 RX ORDER — CLOPIDOGREL BISULFATE 75 MG/1
75 TABLET ORAL DAILY
Qty: 90 TABLET | Refills: 1 | Status: SHIPPED | OUTPATIENT
Start: 2023-04-18 | End: 2023-10-31

## 2023-04-18 RX ORDER — CARVEDILOL 12.5 MG/1
12.5 TABLET ORAL 2 TIMES DAILY
Qty: 180 TABLET | Refills: 1 | Status: SHIPPED | OUTPATIENT
Start: 2023-04-18 | End: 2023-10-31

## 2023-04-18 NOTE — TELEPHONE ENCOUNTER
Mr Bryant from [facility] called wanting to know about the valsartan 160 mg. He wanted to know if you were just filling it as valsartan 160 mg without the hctz component?

## 2023-04-18 NOTE — PROGRESS NOTES
Patient presents for lab draw. R AC, tolerated well. Labs sent to hospital.  Patient was a hard stick. Lab notified

## 2023-04-18 NOTE — PROGRESS NOTES
Idania Phan MD   [address] [address]     Patient ID: [id number]     Chief Complaint: Hypertension and Diabetes        HPI:     Lynda Mendoza is a 82 y.o. female here today for a follow up for HTN and DM. She notes that she is having trouble rising at six in the morning when she takes her Valsartan at night. She just doesn't have the energy she used to. She is not driving now so she feels she has to impose on others to get her around although she is paying them. No other complaints today.       Subjective:     Review of Systems   Constitutional:  Positive for malaise/fatigue.   Respiratory: Negative.     Cardiovascular:         Hasn't been back to Dr Moe, she doesn't like to go to [place] and feels I can take care of his medications. BP are lower at home than here.    Gastrointestinal: Negative.    Musculoskeletal:         Still walking just not as far, it takes longer now to reach her step goals.    Skin: Negative.    Neurological: Negative.    Endo/Heme/Allergies:         Sugars are good, she brought her readings.    Psychiatric/Behavioral: Negative.       Past Medical History:   Diagnosis Date    Acute renal failure     CAD (coronary artery disease)     Diabetes mellitus, type 2     Diverticulosis     HTN (hypertension)     Iron deficiency     Macrocytic anemia     MI (myocardial infarction)         Past Surgical History:   Procedure Laterality Date    BREAST LUMPECTOMY Left     NASAL SEPTOPLASTY      SKIN TAG REMOVAL      SURGICAL REMOVAL OF NODULE OF VOCAL CORD         Family History   Problem Relation Age of Onset    Heart disease Father     Dementia Sister     Heart failure Sister       Her sister has Lupus now, I'll update  Social History     Socioeconomic History    Marital status:    Tobacco Use    Smoking status: Never    Smokeless tobacco: Never   Substance and Sexual Activity    Alcohol use: Not Currently    Drug use: Never     Social Determinants of Health     Financial Resource  Strain: Unknown    Difficulty of Paying Living Expenses: Patient refused   Food Insecurity: Unknown    Worried About Running Out of Food in the Last Year: Patient refused    Ran Out of Food in the Last Year: Patient refused   Transportation Needs: Unknown    Lack of Transportation (Medical): Patient refused    Lack of Transportation (Non-Medical): Patient refused   Physical Activity: Unknown    Days of Exercise per Week: Patient refused    Minutes of Exercise per Session: Patient refused   Stress: Unknown    Feeling of Stress : Patient refused   Social Connections: Unknown    Frequency of Communication with Friends and Family: Patient refused    Frequency of Social Gatherings with Friends and Family: Patient refused    Attends Quaker Services: Patient refused    Active Member of Clubs or Organizations: Patient refused    Attends Club or Organization Meetings: Patient refused    Marital Status: Patient refused   Housing Stability: Unknown    Unable to Pay for Housing in the Last Year: Patient refused    Unstable Housing in the Last Year: Patient refused   She doesn't like to share information but is not having issues with housing or food.     Review of patient's allergies indicates:   Allergen Reactions    Penicillins        Outpatient Medications Marked as Taking for the 4/18/23 encounter (Office Visit) with Idania Phan MD   Medication Sig Dispense Refill    [DISCONTINUED] amLODIPine (NORVASC) 10 MG tablet Take 1 tablet (10 mg total) by mouth once daily. 90 tablet 1    [DISCONTINUED] carvediloL (COREG) 12.5 MG tablet Take 1 tablet (12.5 mg total) by mouth 2 (two) times daily. 180 tablet 1    [DISCONTINUED] clopidogreL (PLAVIX) 75 mg tablet Take 1 tablet (75 mg total) by mouth once daily. 90 tablet 1    [DISCONTINUED] metFORMIN (GLUCOPHAGE) 500 MG tablet Take 1 tablet (500 mg total) by mouth 2 (two) times daily with meals. 180 tablet 1    [DISCONTINUED] simvastatin (ZOCOR) 20 MG tablet Take 1 tablet (20 mg  "total) by mouth every evening. 90 tablet 1    [DISCONTINUED] valsartan-hydrochlorothiazide (DIOVAN-HCT) 160-12.5 mg per tablet TAKE ONE TABLET BY MOUTH DAILY GENERIC DIOVAN/HCTZ NRX IN FILE 10/18/22 30 tablet 5       Patient Care Team:  Idania Phan MD as PCP - General (Internal Medicine)       Objective:     /66 (BP Location: Left arm, Patient Position: Sitting, BP Method: Medium (Automatic))   Pulse 65   Temp 98.3 °F (36.8 °C) (Oral)   Resp 16   Ht 5' 5" (1.651 m)   Wt 69.9 kg (154 lb)   SpO2 97%   BMI 25.63 kg/m²     Physical Exam  Vitals reviewed.   Cardiovascular:      Rate and Rhythm: Normal rate and regular rhythm.      Heart sounds:     No gallop.   Pulmonary:      Effort: Pulmonary effort is normal.      Breath sounds: Normal breath sounds. No rales.   Abdominal:      General: Bowel sounds are normal.      Palpations: Abdomen is soft.   Neurological:      Mental Status: She is alert.   Psychiatric:         Mood and Affect: Mood normal.         Behavior: Behavior normal.         Thought Content: Thought content normal.         Judgment: Judgment normal.      Comments: Her usual self, she refused to answer the questions for social determination.          Assessment/Problems:       ICD-10-CM ICD-9-CM   1. Essential hypertension  I10 401.9   2. Well controlled type 2 diabetes mellitus  E11.9 250.00   3. Iron deficiency anemia due to chronic blood loss  D50.0 280.0   4. Disorder of arteries and arterioles, unspecified  I77.9 447.9   5. Abnormal thyroid function test  R94.6 794.5   6. Stenosis of left carotid artery  I65.22 433.10   7. History of arterial ischemic stroke  Z86.73 V12.54        Plan:     1. Essential hypertension  Comments:  Good today, even better at home, we'll try taking off the diuretic and see if she gets better energy  Orders:  -     Comprehensive Metabolic Panel    2. Well controlled type 2 diabetes mellitus  Comments:  A1C has been excellent, will try to get her to let us do " lab but she hates sticks.   Orders:  -     Comprehensive Metabolic Panel  -     Hemoglobin A1C    3. Iron deficiency anemia due to chronic blood loss  -     CBC Auto Differential  -     Iron and TIBC    4. Disorder of arteries and arterioles, unspecified  Comments:  She is not going back to [place] and we'll check.     5. Abnormal thyroid function test  Comments:  She was on thyroid in past and fatigue will recheck  Orders:  -     TSH    6. Stenosis of left carotid artery  Comments:  If not going to see Dr Moe I'll need to monitor.   Orders:  -     US Carotid Bilateral; Future; Expected date: 04/18/2023    7. History of arterial ischemic stroke  Comments:  Continue Plavix  Orders:  -     US Carotid Bilateral; Future; Expected date: 04/18/2023    Other orders  -     valsartan (DIOVAN) 160 MG tablet; Take 1 tablet (160 mg total) by mouth once daily.  Dispense: 90 tablet; Refill: 1  -     metFORMIN (GLUCOPHAGE) 500 MG tablet; Take 1 tablet (500 mg total) by mouth 2 (two) times daily with meals.  Dispense: 180 tablet; Refill: 1  -     simvastatin (ZOCOR) 20 MG tablet; Take 1 tablet (20 mg total) by mouth every evening.  Dispense: 90 tablet; Refill: 1  -     clopidogreL (PLAVIX) 75 mg tablet; Take 1 tablet (75 mg total) by mouth once daily.  Dispense: 90 tablet; Refill: 1  -     carvediloL (COREG) 12.5 MG tablet; Take 1 tablet (12.5 mg total) by mouth 2 (two) times daily.  Dispense: 180 tablet; Refill: 1  -     amLODIPine (NORVASC) 10 MG tablet; Take 1 tablet (10 mg total) by mouth once daily.  Dispense: 90 tablet; Refill: 1             Follow up in about 3 months (around 7/18/2023) for Wellness. In addition to their scheduled follow up, the patient has also been instructed to follow up on as needed basis.     Signature:  Idania Phan MD  [facility]  [id number] [address]

## 2023-04-19 ENCOUNTER — TELEPHONE (OUTPATIENT)
Dept: PRIMARY CARE CLINIC | Facility: CLINIC | Age: 83
End: 2023-04-19
Payer: MEDICARE

## 2023-04-19 DIAGNOSIS — E11.9 WELL CONTROLLED TYPE 2 DIABETES MELLITUS: Primary | ICD-10-CM

## 2023-04-19 DIAGNOSIS — D50.9 HYPOCHROMIC ANEMIA: Primary | ICD-10-CM

## 2023-04-19 NOTE — TELEPHONE ENCOUNTER
Yes looks like other labs where done.  [facility]. Voiced understanding

## 2023-04-27 ENCOUNTER — TELEPHONE (OUTPATIENT)
Dept: PRIMARY CARE CLINIC | Facility: CLINIC | Age: 83
End: 2023-04-27
Payer: MEDICARE

## 2023-04-27 NOTE — TELEPHONE ENCOUNTER
----- Message from Nitin Bradley MA sent at 4/27/2023  3:25 PM CDT -----  Regarding: FW: call back    ----- Message -----  From: Umm Velez  Sent: 4/27/2023   2:43 PM CDT  To: [facility] Staff  Subject: call back                                        .Type:  Needs Medical Advice    Who Called: hermelinda  Would the patient rather a call back or a response via [facility]?   Best Call Back Number: [phone]  Additional Information: pt is requesting a call back she has questions about Carotid Bilateral .

## 2023-05-09 ENCOUNTER — HOSPITAL ENCOUNTER (OUTPATIENT)
Dept: RADIOLOGY | Facility: HOSPITAL | Age: 83
Discharge: HOME | End: 2023-05-09
Attending: INTERNAL MEDICINE
Payer: MEDICARE

## 2023-05-09 DIAGNOSIS — Z86.73 HISTORY OF ARTERIAL ISCHEMIC STROKE: ICD-10-CM

## 2023-05-09 DIAGNOSIS — I65.22 STENOSIS OF LEFT CAROTID ARTERY: ICD-10-CM

## 2023-05-09 PROCEDURE — 93880 EXTRACRANIAL BILAT STUDY: CPT | Mod: TC

## 2023-06-08 ENCOUNTER — OFFICE VISIT (OUTPATIENT)
Dept: HEMATOLOGY/ONCOLOGY | Facility: CLINIC | Age: 83
End: 2023-06-08
Payer: MEDICARE

## 2023-06-08 VITALS
TEMPERATURE: 98 F | DIASTOLIC BLOOD PRESSURE: 87 MMHG | OXYGEN SATURATION: 98 % | WEIGHT: 147.81 LBS | HEIGHT: 65 IN | SYSTOLIC BLOOD PRESSURE: 163 MMHG | BODY MASS INDEX: 24.63 KG/M2 | HEART RATE: 74 BPM

## 2023-06-08 DIAGNOSIS — E61.1 IRON DEFICIENCY: ICD-10-CM

## 2023-06-08 DIAGNOSIS — D50.9 HYPOCHROMIC ANEMIA: ICD-10-CM

## 2023-06-08 DIAGNOSIS — D53.9 ANEMIA, MACROCYTIC: Primary | ICD-10-CM

## 2023-06-08 LAB
ALBUMIN SERPL-MCNC: 3.8 G/DL (ref 3.4–4.8)
ALBUMIN/GLOB SERPL: 1.1 RATIO (ref 1.1–2)
ALP SERPL-CCNC: 92 UNIT/L (ref 40–150)
ALT SERPL-CCNC: 10 UNIT/L (ref 0–55)
AST SERPL-CCNC: 16 UNIT/L (ref 5–34)
BASOPHILS # BLD AUTO: 0.04 X10(3)/MCL
BASOPHILS NFR BLD AUTO: 0.4 %
BILIRUBIN DIRECT+TOT PNL SERPL-MCNC: 0.4 MG/DL
BUN SERPL-MCNC: 25.4 MG/DL (ref 9.8–20.1)
CALCIUM SERPL-MCNC: 9.2 MG/DL (ref 8.4–10.2)
CHLORIDE SERPL-SCNC: 103 MMOL/L (ref 98–107)
CO2 SERPL-SCNC: 27 MMOL/L (ref 23–31)
CREAT SERPL-MCNC: 1.05 MG/DL (ref 0.55–1.02)
EOSINOPHIL # BLD AUTO: 0.26 X10(3)/MCL (ref 0–0.9)
EOSINOPHIL NFR BLD AUTO: 2.8 %
ERYTHROCYTE [DISTWIDTH] IN BLOOD BY AUTOMATED COUNT: 14.7 % (ref 11.5–17)
FERRITIN SERPL-MCNC: 29.86 NG/ML (ref 4.63–204)
FOLATE SERPL-MCNC: 18.4 NG/ML (ref 7–31.4)
GFR SERPLBLD CREATININE-BSD FMLA CKD-EPI: 53 MLS/MIN/1.73/M2
GLOBULIN SER-MCNC: 3.5 GM/DL (ref 2.4–3.5)
GLUCOSE SERPL-MCNC: 79 MG/DL (ref 82–115)
HCT VFR BLD AUTO: 33.3 % (ref 37–47)
HGB BLD-MCNC: 10 G/DL (ref 12–16)
IGA SERPL-MCNC: 279 MG/DL (ref 69–517)
IGG SERPL-MCNC: 1547 MG/DL (ref 522–1631)
IGM SERPL-MCNC: 31 MG/DL (ref 33–293)
IMM GRANULOCYTES # BLD AUTO: 0.01 X10(3)/MCL (ref 0–0.04)
IMM GRANULOCYTES NFR BLD AUTO: 0.1 %
IRON SATN MFR SERPL: 10 % (ref 20–50)
IRON SERPL-MCNC: 29 UG/DL (ref 50–170)
LYMPHOCYTES # BLD AUTO: 2.08 X10(3)/MCL (ref 0.6–4.6)
LYMPHOCYTES NFR BLD AUTO: 22.2 %
MCH RBC QN AUTO: 28.6 PG (ref 27–31)
MCHC RBC AUTO-ENTMCNC: 30 G/DL (ref 33–36)
MCV RBC AUTO: 95.1 FL (ref 80–94)
MONOCYTES # BLD AUTO: 0.9 X10(3)/MCL (ref 0.1–1.3)
MONOCYTES NFR BLD AUTO: 9.6 %
NEUTROPHILS # BLD AUTO: 6.06 X10(3)/MCL (ref 2.1–9.2)
NEUTROPHILS NFR BLD AUTO: 64.9 %
PLATELET # BLD AUTO: 297 X10(3)/MCL (ref 130–400)
PMV BLD AUTO: 9.4 FL (ref 7.4–10.4)
POTASSIUM SERPL-SCNC: 4.1 MMOL/L (ref 3.5–5.1)
PROT SERPL-MCNC: 7.3 GM/DL (ref 5.8–7.6)
RBC # BLD AUTO: 3.5 X10(6)/MCL (ref 4.2–5.4)
RET# (OHS): 0.04 (ref 0.02–0.08)
RETICULOCYTE COUNT AUTOMATED (OLG): 1.12 % (ref 1.1–2.1)
SODIUM SERPL-SCNC: 139 MMOL/L (ref 136–145)
TIBC SERPL-MCNC: 248 UG/DL (ref 70–310)
TIBC SERPL-MCNC: 277 UG/DL (ref 250–450)
TRANSFERRIN SERPL-MCNC: 249 MG/DL
VIT B12 SERPL-MCNC: 315 PG/ML (ref 213–816)
WBC # SPEC AUTO: 9.35 X10(3)/MCL (ref 4.5–11.5)

## 2023-06-08 PROCEDURE — 99999 PR PBB SHADOW E&M-EST. PATIENT-LVL IV: CPT | Mod: PBBFAC,,, | Performed by: INTERNAL MEDICINE

## 2023-06-08 PROCEDURE — 86225 DNA ANTIBODY NATIVE: CPT | Performed by: INTERNAL MEDICINE

## 2023-06-08 PROCEDURE — 80053 COMPREHEN METABOLIC PANEL: CPT | Performed by: INTERNAL MEDICINE

## 2023-06-08 PROCEDURE — 86235 NUCLEAR ANTIGEN ANTIBODY: CPT | Performed by: INTERNAL MEDICINE

## 2023-06-08 PROCEDURE — 82525 ASSAY OF COPPER: CPT | Performed by: INTERNAL MEDICINE

## 2023-06-08 PROCEDURE — 1159F PR MEDICATION LIST DOCUMENTED IN MEDICAL RECORD: ICD-10-PCS | Mod: CPTII,S$GLB,, | Performed by: INTERNAL MEDICINE

## 2023-06-08 PROCEDURE — 1101F PR PT FALLS ASSESS DOC 0-1 FALLS W/OUT INJ PAST YR: ICD-10-PCS | Mod: CPTII,S$GLB,, | Performed by: INTERNAL MEDICINE

## 2023-06-08 PROCEDURE — 84165 PROTEIN E-PHORESIS SERUM: CPT | Performed by: INTERNAL MEDICINE

## 2023-06-08 PROCEDURE — 83550 IRON BINDING TEST: CPT | Performed by: INTERNAL MEDICINE

## 2023-06-08 PROCEDURE — 3077F SYST BP >= 140 MM HG: CPT | Mod: CPTII,S$GLB,, | Performed by: INTERNAL MEDICINE

## 2023-06-08 PROCEDURE — 85045 AUTOMATED RETICULOCYTE COUNT: CPT | Performed by: INTERNAL MEDICINE

## 2023-06-08 PROCEDURE — 3288F FALL RISK ASSESSMENT DOCD: CPT | Mod: CPTII,S$GLB,, | Performed by: INTERNAL MEDICINE

## 2023-06-08 PROCEDURE — 1160F PR REVIEW ALL MEDS BY PRESCRIBER/CLIN PHARMACIST DOCUMENTED: ICD-10-PCS | Mod: CPTII,S$GLB,, | Performed by: INTERNAL MEDICINE

## 2023-06-08 PROCEDURE — 83521 IG LIGHT CHAINS FREE EACH: CPT | Mod: 59 | Performed by: INTERNAL MEDICINE

## 2023-06-08 PROCEDURE — 99204 OFFICE O/P NEW MOD 45 MIN: CPT | Mod: S$GLB,,, | Performed by: INTERNAL MEDICINE

## 2023-06-08 PROCEDURE — 3079F DIAST BP 80-89 MM HG: CPT | Mod: CPTII,S$GLB,, | Performed by: INTERNAL MEDICINE

## 2023-06-08 PROCEDURE — 1126F PR PAIN SEVERITY QUANTIFIED, NO PAIN PRESENT: ICD-10-PCS | Mod: CPTII,S$GLB,, | Performed by: INTERNAL MEDICINE

## 2023-06-08 PROCEDURE — 1160F RVW MEDS BY RX/DR IN RCRD: CPT | Mod: CPTII,S$GLB,, | Performed by: INTERNAL MEDICINE

## 2023-06-08 PROCEDURE — 82784 ASSAY IGA/IGD/IGG/IGM EACH: CPT | Performed by: INTERNAL MEDICINE

## 2023-06-08 PROCEDURE — 1126F AMNT PAIN NOTED NONE PRSNT: CPT | Mod: CPTII,S$GLB,, | Performed by: INTERNAL MEDICINE

## 2023-06-08 PROCEDURE — 1101F PT FALLS ASSESS-DOCD LE1/YR: CPT | Mod: CPTII,S$GLB,, | Performed by: INTERNAL MEDICINE

## 2023-06-08 PROCEDURE — 82728 ASSAY OF FERRITIN: CPT | Performed by: INTERNAL MEDICINE

## 2023-06-08 PROCEDURE — 82607 VITAMIN B-12: CPT | Performed by: INTERNAL MEDICINE

## 2023-06-08 PROCEDURE — 99204 PR OFFICE/OUTPT VISIT, NEW, LEVL IV, 45-59 MIN: ICD-10-PCS | Mod: S$GLB,,, | Performed by: INTERNAL MEDICINE

## 2023-06-08 PROCEDURE — 36415 COLL VENOUS BLD VENIPUNCTURE: CPT | Performed by: INTERNAL MEDICINE

## 2023-06-08 PROCEDURE — 1159F MED LIST DOCD IN RCRD: CPT | Mod: CPTII,S$GLB,, | Performed by: INTERNAL MEDICINE

## 2023-06-08 PROCEDURE — 3288F PR FALLS RISK ASSESSMENT DOCUMENTED: ICD-10-PCS | Mod: CPTII,S$GLB,, | Performed by: INTERNAL MEDICINE

## 2023-06-08 PROCEDURE — 85025 COMPLETE CBC W/AUTO DIFF WBC: CPT | Performed by: INTERNAL MEDICINE

## 2023-06-08 PROCEDURE — 82746 ASSAY OF FOLIC ACID SERUM: CPT | Performed by: INTERNAL MEDICINE

## 2023-06-08 PROCEDURE — 86334 IMMUNOFIX E-PHORESIS SERUM: CPT | Performed by: INTERNAL MEDICINE

## 2023-06-08 PROCEDURE — 3077F PR MOST RECENT SYSTOLIC BLOOD PRESSURE >= 140 MM HG: ICD-10-PCS | Mod: CPTII,S$GLB,, | Performed by: INTERNAL MEDICINE

## 2023-06-08 PROCEDURE — 99999 PR PBB SHADOW E&M-EST. PATIENT-LVL IV: ICD-10-PCS | Mod: PBBFAC,,, | Performed by: INTERNAL MEDICINE

## 2023-06-08 PROCEDURE — 85060 BLOOD SMEAR INTERPRETATION: CPT | Performed by: INTERNAL MEDICINE

## 2023-06-08 PROCEDURE — 3079F PR MOST RECENT DIASTOLIC BLOOD PRESSURE 80-89 MM HG: ICD-10-PCS | Mod: CPTII,S$GLB,, | Performed by: INTERNAL MEDICINE

## 2023-06-08 NOTE — PROGRESS NOTES
HEMATOLOGY/ONCOLOGY OFFICE CLINIC VISIT    Visit Information:    Initial Evaluation: 6/8/2023  Referring Provider:   Other providers:  Code status:    Diagnosis:  Anemia        CLINICAL HISTORY:       Patient: Lynda Mendoza is a 82 y.o. female kindly referred for anemia.    Reviewing EMR patient has been anemic since 1/2013. At the time anemia was mild with Hb around 11.3. Her hemoglobin slowly decreased but remains between 9.2-10.4 until recently when her Hb was 7.6.    Patient voices no concerns or all.  She denies any fever, chills, sweats.  No chest pain or short of breath.  No changes in bowel habits.  She reports that she exercise regularly and is a very active person.  She still do not understand how she can have anemia when she feels so good.  No family history of malignancies.    Chief Complaint: OTHER (NP referred by Dr. Idania Phan for Hypochromic Anemia. )      Interval History:        Past Medical History:   Diagnosis Date    Acute renal failure     CAD (coronary artery disease)     Diabetes mellitus, type 2     Diverticulosis     HTN (hypertension)     Iron deficiency     Macrocytic anemia     MI (myocardial infarction)       Past Surgical History:   Procedure Laterality Date    BREAST LUMPECTOMY Left     NASAL SEPTOPLASTY      SKIN TAG REMOVAL      SURGICAL REMOVAL OF NODULE OF VOCAL CORD       Family History   Problem Relation Age of Onset    Heart disease Father     Dementia Sister     Heart failure Sister      Social Connections: Unknown    Frequency of Communication with Friends and Family: Patient refused    Frequency of Social Gatherings with Friends and Family: Patient refused    Attends [employer] Services: Patient refused    Active Member of Clubs or Organizations: Patient refused    Attends Club or Organization Meetings: Patient refused    Marital Status: Patient refused       Review of patient's allergies indicates:   Allergen Reactions    Penicillins       Current Outpatient Medications  on File Prior to Visit   Medication Sig Dispense Refill    amLODIPine (NORVASC) 10 MG tablet Take 1 tablet (10 mg total) by mouth once daily. 90 tablet 1    carvediloL (COREG) 12.5 MG tablet Take 1 tablet (12.5 mg total) by mouth 2 (two) times daily. 180 tablet 1    clopidogreL (PLAVIX) 75 mg tablet Take 1 tablet (75 mg total) by mouth once daily. 90 tablet 1    hydrALAZINE (APRESOLINE) 25 MG tablet Take 25 mg by mouth 3 (three) times daily. Taking 75 mg tid      metFORMIN (GLUCOPHAGE) 500 MG tablet Take 1 tablet (500 mg total) by mouth 2 (two) times daily with meals. 180 tablet 1    simvastatin (ZOCOR) 20 MG tablet Take 1 tablet (20 mg total) by mouth every evening. 90 tablet 1    valsartan (DIOVAN) 160 MG tablet Take 1 tablet (160 mg total) by mouth once daily. 90 tablet 1     No current facility-administered medications on file prior to visit.      Review of Systems   Constitutional:  Negative for activity change, appetite change, chills, fatigue, fever and unexpected weight change.   HENT:  Negative for mouth dryness, mouth sores, nosebleeds, sore throat and trouble swallowing.    Eyes:  Negative for visual disturbance.   Respiratory:  Negative for cough and shortness of breath.    Cardiovascular:  Negative for chest pain, palpitations and leg swelling.   Gastrointestinal:  Negative for abdominal distention, abdominal pain, blood in stool, change in bowel habit, constipation, diarrhea, nausea, vomiting and change in bowel habit.   Endocrine: Negative.    Genitourinary:  Negative for dysuria, frequency, hematuria and urgency.   Musculoskeletal:  Negative for arthralgias, back pain, myalgias and neck pain.   Integumentary:  Negative for rash.   Neurological:  Negative for dizziness, tremors, syncope, speech difficulty, weakness, light-headedness, numbness, headaches and memory loss.   Hematological:  Does not bruise/bleed easily.   Psychiatric/Behavioral:  Negative for confusion and suicidal ideas.            "  Vitals:    06/08/23 1311   BP: (!) 163/87  Comment: LT arm-147/63   BP Location: Right arm   Patient Position: Sitting   Pulse: 74   Temp: 97.7 °F (36.5 °C)   TempSrc: Oral   SpO2: 98%   Weight: 67 kg (147 lb 12.8 oz)   Height: 5' 5" (1.651 m)      Wt Readings from Last 6 Encounters:   06/08/23 67 kg (147 lb 12.8 oz)   04/18/23 69.9 kg (154 lb)   10/18/22 65.8 kg (145 lb)   08/12/20 73.5 kg (161 lb 15.9 oz)     Body mass index is 24.6 kg/m².  Body surface area is 1.75 meters squared.  Physical Exam  Vitals and nursing note reviewed.   Constitutional:       General: She is not in acute distress.     Appearance: Normal appearance. She is well-developed.   HENT:      Head: Normocephalic and atraumatic.      Mouth/Throat:      Mouth: Mucous membranes are moist.   Eyes:      General: No scleral icterus.     Extraocular Movements: Extraocular movements intact.      Conjunctiva/sclera: Conjunctivae normal.      Pupils: Pupils are equal, round, and reactive to light.   Neck:      Vascular: No JVD.   Cardiovascular:      Rate and Rhythm: Normal rate and regular rhythm.      Heart sounds: No murmur heard.  Pulmonary:      Effort: Pulmonary effort is normal.      Breath sounds: Normal breath sounds. No wheezing or rhonchi.   Abdominal:      General: Bowel sounds are normal. There is no distension.      Palpations: Abdomen is soft. There is no mass.      Tenderness: There is no abdominal tenderness.   Musculoskeletal:         General: No swelling or deformity.      Cervical back: Neck supple.   Lymphadenopathy:      Head:      Right side of head: No submandibular adenopathy.      Left side of head: No submandibular adenopathy.      Cervical: No cervical adenopathy.      Upper Body:      Right upper body: No supraclavicular or axillary adenopathy.      Left upper body: No supraclavicular or axillary adenopathy.      Lower Body: No right inguinal adenopathy. No left inguinal adenopathy.   Skin:     General: Skin is warm.      " Coloration: Skin is not jaundiced.      Findings: No lesion or rash.      Nails: There is no clubbing.   Neurological:      General: No focal deficit present.      Mental Status: She is alert and oriented to person, place, and time.      Cranial Nerves: Cranial nerves 2-12 are intact.   Psychiatric:         Attention and Perception: Attention normal.         Behavior: Behavior is cooperative.         Judgment: Judgment normal.     ECOG SCORE    0 - Fully active-able to carry on all pre-disease performance without restriction         Laboratory:  CBC with Differential:  Lab Results   Component Value Date    WBC 9.35 06/08/2023    RBC 3.50 (L) 06/08/2023    HGB 10.0 (L) 06/08/2023    HCT 33.3 (L) 06/08/2023    MCV 95.1 (H) 06/08/2023    MCH 28.6 06/08/2023    MCHC 30.0 (L) 06/08/2023    RDW 14.7 06/08/2023     06/08/2023    MPV 9.4 06/08/2023       Latest Reference Range & Units 02/22/18 16:45 08/22/18 16:00 04/18/23 11:38   WBC 4.5 - 11.5 x10(3)/mcL 9.2 8.0 5.1   RBC 4.20 - 5.40 x10(6)/mcL 3.50 (L) 3.34 (L) 2.55 (L)   Hemoglobin 12.0 - 16.0 g/dL 10.4 (L) 9.8 (L) 7.6 (L)   Hematocrit 37.0 - 47.0 % 32.4 (L) 31.8 (L) 25.7 (L)   MCV 80.0 - 94.0 fL 92.3 95.2 (H) 100.8 (H)   MCH 27.0 - 31.0 pg 29.6 29.3 29.8   MCHC 33.0 - 36.0 g/dL 32.0 (L) 30.8 (L) 29.6 (L)   RDW 11.5 - 17.0 % 15.2 13.8 16.1   Platelets 130 - 400 x10(3)/mcL 274 286 225   (L): Data is abnormally low  (H): Data is abnormally high   Latest Reference Range & Units 08/22/18 16:00 04/18/23 11:38   Iron 50 - 170 ug/dL 42 (L) 47 (L)   TIBC 250 - 450 ug/dL 269 289   Iron Binding Capacity Unsaturated 70 - 310 ug/dL  242   Transferrin mg/dL 210.0 266   Iron Saturation 20 - 50 % 15.6 (L) 16 (L)   (L): Data is abnormally low    Thyroid Stimulating Hormone 0.350 - 4.940 uIU/mL 1.795      CMP:  Sodium Level   Date Value Ref Range Status   06/08/2023 139 136 - 145 mmol/L Final     Potassium Level   Date Value Ref Range Status   06/08/2023 4.1 3.5 - 5.1 mmol/L  Final     Carbon Dioxide   Date Value Ref Range Status   06/08/2023 27 23 - 31 mmol/L Final     Blood Urea Nitrogen   Date Value Ref Range Status   06/08/2023 25.4 (H) 9.8 - 20.1 mg/dL Final     Creatinine   Date Value Ref Range Status   06/08/2023 1.05 (H) 0.55 - 1.02 mg/dL Final     Calcium Level Total   Date Value Ref Range Status   06/08/2023 9.2 8.4 - 10.2 mg/dL Final     Albumin Level   Date Value Ref Range Status   06/08/2023 3.8 3.4 - 4.8 g/dL Final     Bilirubin Total   Date Value Ref Range Status   06/08/2023 0.4 <=1.5 mg/dL Final     Alkaline Phosphatase   Date Value Ref Range Status   06/08/2023 92 40 - 150 unit/L Final     Aspartate Aminotransferase   Date Value Ref Range Status   06/08/2023 16 5 - 34 unit/L Final     Alanine Aminotransferase   Date Value Ref Range Status   06/08/2023 10 0 - 55 unit/L Final     Estimated GFR-Non    Date Value Ref Range Status   08/22/2018 59 mL/min/1.73 m2 Final        Oncology Health maintenance:  Colon cancer screening:  --Colonoscopy:   --Color guard:  Male:   --Prostate cancer screening: PSA  Female:  --Screening mammogram:  --PAP smear:  Smoking:   -Lung cancer screening: Low dose CT:         Assessment:       1. Anemia, macrocytic    2. Hypochromic anemia    3. Iron deficiency      I have discussed with the patient what anemia is, workup, causes and treatment depending of the cause. Discussed that anemia is a condition that develops when the blood lacks enough healthy red blood cells or hemoglobin. Hemoglobin is a main part of red blood cells and binds oxygen. There are different causes of anemia but the most common includes: Anemia caused by blood loss, decreased or faulty red blood cell production or anemia caused by destruction of red blood cells.  Anemia can be caused by  Gastrointestinal conditions such as ulcers, hemorrhoids, gastritis (inflammation of the stomach), use of nonsteroidal anti-inflammatory drugs (NSAIDs) such as aspirin or  ibuprofen, which can cause ulcers and gastritis and cancer, ie, gastric, colorectal. Anemia can be cause by Iron-deficiency or other Vitamin deficiency, Bone marrow and stem cell problems, ie MDS, Multiple Myeloma, Lymphoma,etc.  It can be associated with other conditions, ie, Advanced kidney disease, Hypothyroidism, chronic diseases, such as cancer, infection, lupus, diabetes, and rheumatoid arthritis.             Plan:       Patient with chronic anemia but recently worsening and now with microcytosis.  Because of this finding and her age, Bone marrow disorders like MDS, etc. is part of the differential.  She will like to wait after the blood work or bone marrow biopsy.      Do basic anemia workup today.  I will meet with her in 2 weeks to discuss results and +/- bone marrow biopsy.  The patient was seen, interviewed and examined. Pertinent lab and radiology studies were reviewed.   The patient was given ample opportunity to ask questions, and to the best of my abilities, all questions answered to satisfaction; patient demonstrated understanding of what we discussed and agreeable to the plan. Pt instructed to call should develop concerning signs/symptoms or have further questions.     I'd like to thank for referring and allowing me the opportunity to participate in the care of this patient and if any questions, please do not hesitate to call the office at [phone].       Renee Henson MD  Hematology/Oncology

## 2023-06-09 LAB
ALBUMIN % SPEP (OHS): 45.95
ALBUMIN SERPL-MCNC: 3.3 G/DL (ref 3.4–4.8)
ALBUMIN/GLOB SERPL: 0.9 RATIO (ref 1.1–2)
ALPHA 1 GLOB (OHS): 0.26 GM/DL
ALPHA 1 GLOB% (OHS): 3.65
ALPHA 2 GLOB % (OHS): 12.42
ALPHA 2 GLOB (OHS): 0.88 GM/DL
BETA GLOB (OHS): 1.15 GM/DL
BETA GLOB% (OHS): 16.2
COPPER SERPL-MCNC: 88 MCG/DL (ref 77–206)
GAMMA GLOBULIN % (OHS): 21.78
GAMMA GLOBULIN (OHS): 1.55 GM/DL
GLOBULIN SER-MCNC: 3.8 GM/DL (ref 2.4–3.5)
HEMATOLOGIST REVIEW: NORMAL
KAPPA LC FREE SER-MCNC: 5.21 MG/DL (ref 0.33–1.94)
KAPPA LC FREE/LAMBDA FREE SER: 2.06 {RATIO} (ref 0.26–1.65)
LAMBDA LC FREE SERPL-MCNC: 2.53 MG/DL (ref 0.57–2.63)
M SPIKE % (OHS): ABNORMAL
M SPIKE (OHS): ABNORMAL
PATH REV: NORMAL
PROT SERPL-MCNC: 7.1 GM/DL (ref 5.8–7.6)

## 2023-06-10 LAB
ANTINUCLEAR ANTIBODY SCREEN (OHS): NEGATIVE
CENTROMERE QUANT (OHS): 0.4 U/ML
DSDNA AB QUANT (OHS): 107 IU/ML
JO-1 AB QUANT (OHS): <0.3 U/ML
RNP70 AB QUANT (OHS): 0.5 U/ML
SCL-70S AB QUANT (OHS): <0.6 U/ML
SMITH AB QUANT (OHS): <0.8 U/ML
SSA(RO) AB QUANT (OHS): <0.4 U/ML
SSB(LA) AB QUANT (OHS): <0.4 U/ML
U1RNP AB QUANT (OHS): 0.8 U/ML

## 2023-07-12 ENCOUNTER — TELEPHONE (OUTPATIENT)
Dept: PRIMARY CARE CLINIC | Facility: CLINIC | Age: 83
End: 2023-07-12
Payer: MEDICARE

## 2023-07-19 ENCOUNTER — TELEPHONE (OUTPATIENT)
Dept: PRIMARY CARE CLINIC | Facility: CLINIC | Age: 83
End: 2023-07-19

## 2023-07-19 NOTE — TELEPHONE ENCOUNTER
Patient No Show for appt 7/19/23 @10:40am.  Tried to call pt, NA no VM  No Show letter mailed to patient

## 2023-08-15 ENCOUNTER — TELEPHONE (OUTPATIENT)
Dept: PRIMARY CARE CLINIC | Facility: CLINIC | Age: 83
End: 2023-08-15
Payer: MEDICARE

## 2023-08-15 NOTE — TELEPHONE ENCOUNTER
----- Message from Sandra Askew sent at 8/15/2023  8:57 AM CDT -----  .Type:  Test Results    Who Called: pt daughter Mary   Name of Test (Lab/Mammo/Etc): labs   Date of Test:   Ordering Provider: Sylvester   Where the test was performed:   Would the patient rather a call back or a response via [facility]? Call back   Best Call Back Number: [phone]  Additional Information:  pt daughter states that she does not have her lab results

## 2023-08-15 NOTE — TELEPHONE ENCOUNTER
1. Are there any outstanding tasks in patient's chart? (Ex. Labs, MMG)?no    2. Do we have any outstanding/Pending referrals?no    3. Has the patient been seen in an ER, Urgent Care or been admitted to the hospital since last office visit with our office?no    4. Has the patient seen any other health care provider (doctors) since last visit?no    5. Has the patient had any blood work or x-rays done since last visit?no    FOR CLINICAL USE ONLY (DO NOT ASK PATIENT)    6. Is the patients pneumonia vaccine current?  Prevnar 13:12/17/20  Pneumonia 20:-  Pneumovax 23:-    7. When was the patient's Colonoscopy?-    8. When was the patient's last Mammogram?-    9.When was the patients last cervical screening/PAP smear?-    10. When was the patient's last bone scan?-    11. When was the patient's last diabetic screening?  A1C:4/28/22  Mirco:8/22/18  Eye Exam: -

## 2023-08-21 PROBLEM — N18.31 CHRONIC KIDNEY DISEASE, STAGE 3A: Status: ACTIVE | Noted: 2023-08-21

## 2023-08-21 PROBLEM — I25.10 ATHEROSCLEROSIS OF NATIVE CORONARY ARTERY OF NATIVE HEART WITHOUT ANGINA PECTORIS: Status: RESOLVED | Noted: 2022-10-18 | Resolved: 2023-08-21

## 2023-08-21 PROBLEM — E04.2 MULTINODULAR THYROID: Status: ACTIVE | Noted: 2023-08-21

## 2023-08-21 PROBLEM — I65.23 BILATERAL CAROTID ARTERY STENOSIS: Status: ACTIVE | Noted: 2023-08-21

## 2023-08-21 PROBLEM — I73.9 PERIPHERAL VASCULAR DISEASE: Status: ACTIVE | Noted: 2023-04-17

## 2023-08-21 NOTE — PROGRESS NOTES
Patient ID: [id number]     Chief Complaint: Medicare AWV Follow Up      HPI:     Lynda Mendoza is a 82 y.o. female here today for a Medicare Wellness. She has still been walking but just in her home with this heat. She has been checking her BP at home.       Opioid Screening: Patient medication list reviewed, patient is not taking prescription opioids. Patient is not using additional opioids than prescribed. Patient is at low risk of substance abuse based on this opioid use history.       Past Medical History:   Diagnosis Date    Acute renal failure     Bilateral carotid artery stenosis 8/21/2023    <50% right, 50-69% Right internal    CAD (coronary artery disease)     Diabetes mellitus, type 2     Diverticulosis     HTN (hypertension)     Iron deficiency     Macrocytic anemia     MI (myocardial infarction)         Past Surgical History:   Procedure Laterality Date    BREAST LUMPECTOMY Left     NASAL SEPTOPLASTY      SKIN TAG REMOVAL      SURGICAL REMOVAL OF NODULE OF VOCAL CORD         Review of patient's allergies indicates:   Allergen Reactions    Penicillins        Outpatient Medications Marked as Taking for the 8/22/23 encounter (Office Visit) with Idania Phan MD   Medication Sig Dispense Refill    amLODIPine (NORVASC) 10 MG tablet Take 1 tablet (10 mg total) by mouth once daily. 90 tablet 1    aspirin (ECOTRIN) 81 MG EC tablet Take 81 mg by mouth once daily.      carvediloL (COREG) 12.5 MG tablet Take 1 tablet (12.5 mg total) by mouth 2 (two) times daily. 180 tablet 1    cholecalciferol, vitamin D3, (VITAMIN D3 ORAL) Take 100 mcg by mouth once daily.      clopidogreL (PLAVIX) 75 mg tablet Take 1 tablet (75 mg total) by mouth once daily. 90 tablet 1    hydrALAZINE (APRESOLINE) 25 MG tablet Take 25 mg by mouth 3 (three) times daily. Taking 75 mg tid      metFORMIN (GLUCOPHAGE) 500 MG tablet Take 1 tablet (500 mg total) by mouth 2 (two) times daily with meals. 180 tablet 1    simvastatin (ZOCOR) 20 MG  tablet Take 1 tablet (20 mg total) by mouth every evening. 90 tablet 1    valsartan (DIOVAN) 160 MG tablet Take 1 tablet (160 mg total) by mouth once daily. 90 tablet 1    vitamin E 100 UNIT capsule Take 100 Units by mouth once a week.         Social History     Socioeconomic History    Marital status:    Tobacco Use    Smoking status: Never    Smokeless tobacco: Never   Substance and Sexual Activity    Alcohol use: Not Currently    Drug use: Never     Social Determinants of Health     Financial Resource Strain: Unknown (4/18/2023)    Overall Financial Resource Strain (CARDIA)     Difficulty of Paying Living Expenses: Patient refused   Food Insecurity: Unknown (4/18/2023)    Hunger Vital Sign     Worried About Running Out of Food in the Last Year: Patient refused     Ran Out of Food in the Last Year: Patient refused   Transportation Needs: Unknown (4/18/2023)    PRAPARE - Transportation     Lack of Transportation (Medical): Patient refused     Lack of Transportation (Non-Medical): Patient refused   Physical Activity: Unknown (4/18/2023)    Exercise Vital Sign     Days of Exercise per Week: Patient refused     Minutes of Exercise per Session: Patient refused   Stress: Unknown (4/18/2023)    [employer] [place] [employer] - Occupational Stress Questionnaire     Feeling of Stress : Patient refused   Social Connections: Unknown (4/18/2023)    Social Connection and Isolation Panel [NHANES]     Frequency of Communication with Friends and Family: Patient refused     Frequency of Social Gatherings with Friends and Family: Patient refused     Attends Religion Services: Patient refused     Active Member of Clubs or Organizations: Patient refused     Attends Club or Organization Meetings: Patient refused     Marital Status: Patient refused   Housing Stability: Unknown (4/18/2023)    Housing Stability Vital Sign     Unable to Pay for Housing in the Last Year: Patient refused     Unstable Housing in the Last  Year: Patient refused        Family History   Problem Relation Age of Onset    Heart disease Father     Dementia Sister     Heart failure Sister         Patient Care Team:  Idania Phan MD as PCP - General (Internal Medicine)       Subjective:     Review of Systems   Constitutional: Negative.    HENT:  Positive for congestion.         She had a sinus congestion and took Tussin, she tried a spray and got sores in her nostril. She has been doing OTC, she brought up a lot of phlegm. Hoarse   Eyes:         Went to eye last year   Respiratory:  Positive for cough.    Cardiovascular:  Negative for chest pain and palpitations.        She has not been back to see Dr Moe.    Gastrointestinal: Negative.    Genitourinary: Negative.         No incontinence   Neurological: Negative.    Endo/Heme/Allergies: Negative.    Psychiatric/Behavioral: Negative.           Patient Reported Health Risk Assessment  What is your age?: 80 or older  Are you male or female?: Female  During the past four weeks, how much have you been bothered by emotional problems such as feeling anxious, depressed, irritable, sad, or downhearted and blue?: Not at all  During the past five weeks, has your physical and/or emotional health limited your social activities with family, friends, neighbors, or groups?: Not at all  During the past four weeks, how much bodily pain have you generally had?: No pain  During the past four weeks, was someone available to help if you needed and wanted help?: No, not at all  During the past four weeks, what was the hardest physical activity you could do for at least two minutes?: Very light  Can you get to places out of walking distance without help?  (For example, can you travel alone on buses or taxis, or drive your own car?): Yes  Can you go shopping for groceries or clothes without someone's help?: Yes  Can you prepare your own meals?: Yes  Can you do your own housework without help?: Yes  Because of any health  problems, do you need the help of another person with your personal care needs such as eating, bathing, dressing, or getting around the house?: No  Can you handle your own money without help?: Yes  During the past four weeks, how would you rate your health in general?: Good  How have things been going for you during the past four weeks?: Pretty well  Are you having difficulties driving your car?: Not applicable, I do not use a car  Do you always fasten your seat belt when you are in a car?: Yes, usually  How often in the past four weeks have you been bothered by falling or dizzy when standing up?: Never  How often in the past four weeks have you been bothered by sexual problems?: Never  How often in the past four weeks have you been bothered by trouble eating well?: Never  How often in the past four weeks have you been bothered by teeth or denture problems?: Never  How often in the past four weeks have you been bothered with problems using the telephone?: Never  How often in the past four weeks have you been bothered by tiredness or fatigue?: Never  Have you fallen two or more times in the past year?: No  Are you afraid of falling?: No  Are you a smoker?: No  During the past four weeks, how many drinks of wine, beer, or other alcoholic beverages did you have?: No alcohol at all  Do you exercise for about 20 minutes three or more days a week?: Yes, most of the time  Have you been given any information to help you with hazards in your house that might hurt you?: No  Have you been given any information to help you with keeping track of your medications?: No  How often do you have trouble taking medicines the way you've been told to take them?: I always take them as prescribed  How confident are you that you can control and manage most of your health problems?: Very confident  What is your race? (Check all that apply.):     Objective:     /64 (BP Location: Left arm, Patient Position: Sitting, BP  "Method: Medium (Automatic))   Pulse 88   Temp 98.4 °F (36.9 °C)   Resp 16   Ht 5' 5" (1.651 m)   Wt 64.8 kg (142 lb 12.8 oz)   SpO2 97%   BMI 23.76 kg/m²     Physical Exam  Vitals reviewed.   Constitutional:       Comments: Appears to have lost weight, clothes are loose, no temporal wasting   HENT:      Head: Normocephalic and atraumatic.      Right Ear: Ear canal and external ear normal.      Left Ear: External ear normal.      Ears:      Comments: R has moderate wax, left is occluded, what is visible of drum on right is normal     Mouth/Throat:      Mouth: Mucous membranes are moist.      Pharynx: No posterior oropharyngeal erythema.   Eyes:      General: No scleral icterus.     Extraocular Movements: Extraocular movements intact.      Conjunctiva/sclera: Conjunctivae normal.      Pupils: Pupils are equal, round, and reactive to light.   Neck:      Vascular: No carotid bruit.   Cardiovascular:      Rate and Rhythm: Normal rate and regular rhythm.      Pulses:           Dorsalis pedis pulses are 2+ on the right side and 2+ on the left side.        Posterior tibial pulses are 2+ on the right side and 2+ on the left side.      Heart sounds: Murmur heard.   Pulmonary:      Effort: Pulmonary effort is normal.      Breath sounds: Normal breath sounds.   Abdominal:      General: Abdomen is flat. Bowel sounds are normal.      Palpations: Abdomen is soft.      Tenderness: There is no abdominal tenderness. There is no guarding.   Musculoskeletal:         General: No tenderness.      Cervical back: Neck supple.      Right foot: Deformity present.      Left foot: Deformity present.   Feet:      Right foot:      Protective Sensation: 10 sites tested.  10 sites sensed.      Skin integrity: Dry skin present.      Toenail Condition: Right toenails are abnormally thick.      Left foot:      Protective Sensation: 10 sites tested.  10 sites sensed.      Skin integrity: Dry skin present.      Toenail Condition: Left toenails " are abnormally thick.      Comments: Arthritic changes in the toes  Lymphadenopathy:      Cervical: No cervical adenopathy.   Skin:     General: Skin is warm and dry.      Coloration: Skin is pale.   Neurological:      Mental Status: She is alert and oriented to person, place, and time. Mental status is at baseline.      Motor: No weakness.      Gait: Gait normal.   Psychiatric:         Mood and Affect: Mood normal.         Behavior: Behavior normal.         Thought Content: Thought content normal.         Judgment: Judgment normal.           No flowsheet data found.  Fall Risk Assessment - Outpatient 8/22/2023 6/8/2023 4/18/2023   Mobility Status Ambulatory Ambulatory Ambulatory   Number of falls 0 0 0   Identified as fall risk 0 0 0           Depression Screening  Over the past two weeks, has the patient felt down, depressed, or hopeless?: No  Over the past two weeks, has the patient felt little interest or pleasure in doing things?: No  Functional Ability/Safety Screening  Was the patient's timed Up & Go test unsteady or longer than 30 seconds?: No  Does the patient need help with phone, transportation, shopping, preparing meals, housework, laundry, meds, or managing money?: No  Does the patient's home have rugs in the hallway, lack grab bars in the bathroom, lack handrails on the stairs or have poor lighting?: No  Have you noticed any hearing difficulties?: No  Cognitive Function (Assessed through direct observation with due consideration of information obtained by way of patient reports and/or concerns raised by family, friends, caretakers, or others)    Does the patient repeat questions/statements in the same day?: No  Does the patient have trouble remembering the date, year, and time?: No  Does the patient have difficulty managing finances?: No  Does the patient have a decreased sense of direction?: No  Office Visit on 08/22/2023   Component Date Value    Hemoglobin A1c 08/22/2023 6.0     Estimated Average  Glucose 08/22/2023 125.5     Sodium Level 08/22/2023 141     Potassium Level 08/22/2023 4.8     Chloride 08/22/2023 106     Carbon Dioxide 08/22/2023 27     Glucose Level 08/22/2023 94     Blood Urea Nitrogen 08/22/2023 26.7 (H)     Creatinine 08/22/2023 1.20 (H)     Calcium Level Total 08/22/2023 9.0     Protein Total 08/22/2023 7.0     Albumin Level 08/22/2023 3.6     Globulin 08/22/2023 3.4     Albumin/Globulin Ratio 08/22/2023 1.1     Bilirubin Total 08/22/2023 0.3     Alkaline Phosphatase 08/22/2023 68     Alanine Aminotransferase 08/22/2023 10     Aspartate Aminotransfera* 08/22/2023 14     eGFR 08/22/2023 45     Cholesterol Total 08/22/2023 144     HDL Cholesterol 08/22/2023 54     Triglyceride 08/22/2023 70     Cholesterol/HDL Ratio 08/22/2023 3     Very Low Density Lipopro* 08/22/2023 14     LDL Cholesterol 08/22/2023 76.00     Color, UA 08/22/2023 Yellow     Appearance, UA 08/22/2023 Clear     Specific Gravity, UA 08/22/2023 1.020     pH, UA 08/22/2023 5.5     Protein, UA 08/22/2023 Negative     Glucose, UA 08/22/2023 Negative     Ketones, UA 08/22/2023 Trace (A)     Blood, UA 08/22/2023 Negative     Bilirubin, UA 08/22/2023 Small (A)     Urobilinogen, UA 08/22/2023 0.2     Nitrites, UA 08/22/2023 Negative     Leukocyte Esterase, UA 08/22/2023 Small (A)     WBC 08/22/2023 8.59     RBC 08/22/2023 3.33 (L)     Hgb 08/22/2023 9.3 (L)     Hct 08/22/2023 31.0 (L)     MCV 08/22/2023 93.1     MCH 08/22/2023 27.9     MCHC 08/22/2023 30.0 (L)     RDW 08/22/2023 14.6     Platelet 08/22/2023 329     MPV 08/22/2023 9.9     Neut % 08/22/2023 65.1     Lymph % 08/22/2023 22.5     Mono % 08/22/2023 9.8     Eos % 08/22/2023 2.3     Basophil % 08/22/2023 0.2     Lymph # 08/22/2023 1.93     Neut # 08/22/2023 5.59     Mono # 08/22/2023 0.84     Eos # 08/22/2023 0.20     Baso # 08/22/2023 0.02     IG# 08/22/2023 0.01     IG% 08/22/2023 0.1    Office Visit on 06/08/2023   Component Date Value    Iron Binding Capacity Un*  06/08/2023 248     Iron Level 06/08/2023 29 (L)     Transferrin 06/08/2023 249     Iron Binding Capacity To* 06/08/2023 277     Iron Saturation 06/08/2023 10 (L)     Ferritin Level 06/08/2023 29.86     Retic Cnt Auto 06/08/2023 1.12     RET# 06/08/2023 0.0389     Peripheral Smear Evaluat* 06/08/2023 - Normocytic, normochromic anemia.    Impression: Normocytic anemia can be seen in early iron deficiency anemia, anemia of chronic disease and acute blood loss.    Omari Parada M.D.      BLAIRE Screen 06/08/2023 Negative     DSDNA Ab Quant 06/08/2023 107.0 (H)     U1RNP Ab Quant 06/08/2023 0.8     RNP70 Ab Quant 06/08/2023 0.5     SSA(Ro) Ab Quant 06/08/2023 <0.4     SSB(La) Ab Quant 06/08/2023 <0.4     Centromere Ab Quant 06/08/2023 0.4     SCL-70S Ab Quant 06/08/2023 <0.6     GREGORY-1 Ab Quant 06/08/2023 <0.3     [place] DP IgG Quant 06/08/2023 <0.8     Sodium Level 06/08/2023 139     Potassium Level 06/08/2023 4.1     Chloride 06/08/2023 103     Carbon Dioxide 06/08/2023 27     Glucose Level 06/08/2023 79 (L)     Blood Urea Nitrogen 06/08/2023 25.4 (H)     Creatinine 06/08/2023 1.05 (H)     Calcium Level Total 06/08/2023 9.2     Protein Total 06/08/2023 7.3     Albumin Level 06/08/2023 3.8     Globulin 06/08/2023 3.5     Albumin/Globulin Ratio 06/08/2023 1.1     Bilirubin Total 06/08/2023 0.4     Alkaline Phosphatase 06/08/2023 92     Alanine Aminotransferase 06/08/2023 10     Aspartate Aminotransfera* 06/08/2023 16     eGFR 06/08/2023 53     Protein Total 06/08/2023 7.1     Albumin Level 06/08/2023 3.3 (L)     Globulin 06/08/2023 3.8 (H)     Albumin/Globulin Ratio 06/08/2023 0.9 (L)     Albumin, SPEP 06/08/2023 45.95     Alpha 1 Glob % 06/08/2023 3.65     Alpha 1 Glob 06/08/2023 0.26     Alpha 2 Glob% 06/08/2023 12.42     Alpha 2 Glob 06/08/2023 0.88     Beta Glob % 06/08/2023 16.20     Beta Glob 06/08/2023 1.15     Gamma Globulin % 06/08/2023 21.78     Gamma Globulin 06/08/2023 1.55     M Chandler % 06/08/2023      M  Chandler 06/08/2023      Vitamin B12 Level 06/08/2023 315     Folate Level 06/08/2023 18.4     Copper 06/08/2023 88     WBC 06/08/2023 9.35     RBC 06/08/2023 3.50 (L)     Hgb 06/08/2023 10.0 (L)     Hct 06/08/2023 33.3 (L)     MCV 06/08/2023 95.1 (H)     MCH 06/08/2023 28.6     MCHC 06/08/2023 30.0 (L)     RDW 06/08/2023 14.7     Platelet 06/08/2023 297     MPV 06/08/2023 9.4     Neut % 06/08/2023 64.9     Lymph % 06/08/2023 22.2     Mono % 06/08/2023 9.6     Eos % 06/08/2023 2.8     Basophil % 06/08/2023 0.4     Lymph # 06/08/2023 2.08     Neut # 06/08/2023 6.06     Mono # 06/08/2023 0.90     Eos # 06/08/2023 0.26     Baso # 06/08/2023 0.04     IG# 06/08/2023 0.01     IG% 06/08/2023 0.1     IgG Level 06/08/2023 1,547.00     IgA Level 06/08/2023 279.0     IgM Level 06/08/2023 31.0 (L)     [place] Free Light Chain 06/08/2023 5.21 (H)     Lambda Free Light Chain 06/08/2023 2.53     Kappa/Lambda FLC Ratio 06/08/2023 2.06 (H)     Pathology Review 06/08/2023 SPEP: No M-spike indicative of a monoclonal protein is identified.    CARLA:  Immunofixation shows a normal pattern of immunoglobulins.    No monoclonal bands are detected.    Omari Parada M.D.     Office Visit on 04/18/2023   Component Date Value    Sodium Level 04/18/2023 141     Potassium Level 04/18/2023 4.3     Chloride 04/18/2023 106     Carbon Dioxide 04/18/2023 25     Glucose Level 04/18/2023 119 (H)     Blood Urea Nitrogen 04/18/2023 24.3 (H)     Creatinine 04/18/2023 1.08 (H)     Calcium Level Total 04/18/2023 8.8     Protein Total 04/18/2023 7.1     Albumin Level 04/18/2023 3.5     Globulin 04/18/2023 3.6 (H)     Albumin/Globulin Ratio 04/18/2023 1.0 (L)     Bilirubin Total 04/18/2023 0.3     Alkaline Phosphatase 04/18/2023 72     Alanine Aminotransferase 04/18/2023 10     Aspartate Aminotransfera* 04/18/2023 22     eGFR 04/18/2023 51     Thyroid Stimulating Horm* 04/18/2023 1.795     Iron Binding Capacity Un* 04/18/2023 242     Iron Level 04/18/2023 47  (L)     Transferrin 04/18/2023 266     Iron Binding Capacity To* 04/18/2023 289     Iron Saturation 04/18/2023 16 (L)     WBC 04/18/2023 5.1     RBC 04/18/2023 2.55 (L)     Hgb 04/18/2023 7.6 (L)     Hct 04/18/2023 25.7 (L)     MCV 04/18/2023 100.8 (H)     MCH 04/18/2023 29.8     MCHC 04/18/2023 29.6 (L)     RDW 04/18/2023 16.1     Platelet 04/18/2023 225     MPV 04/18/2023 11.3 (H)     Neut % 04/18/2023 61.1     Lymph % 04/18/2023 25.5     Mono % 04/18/2023 9.5     Eos % 04/18/2023 2.3     Basophil % 04/18/2023 1.4     Lymph # 04/18/2023 1.31     Neut # 04/18/2023 3.14     Mono # 04/18/2023 0.49     Eos # 04/18/2023 0.12     Baso # 04/18/2023 0.07     IG# 04/18/2023 0.01     IG% 04/18/2023 0.2        Assessment/Plan:     1. Medicare annual wellness visit, subsequent  -     Hemoglobin A1C    2. Well controlled type 2 diabetes mellitus  Comments:  A1C due, she brought her log and levels look good  Orders:  -     Cancel: Comprehensive Metabolic Panel  -     Cancel: Hemoglobin A1C  -     Cancel: Urinalysis, Reflex to Urine Culture  -     Cancel: Microalbumin/Creatinine Ratio, Urine  -     Cancel: Lipid Panel  -     Hemoglobin A1C  -     Comprehensive Metabolic Panel  -     Lipid Panel  -     Microalbumin/Creatinine Ratio, Urine  -     Urinalysis, Reflex to Urine Culture  -     Urinalysis, Microscopic    3. Iron deficiency anemia due to chronic blood loss  Comments:  Will recheck while drawing, she is supposed to follow up with Dr Henson  Orders:  -     Cancel: CBC Auto Differential  -     CBC Auto Differential    4. Essential hypertension  Comments:  Good control, she brought her logs, sometimes she's lower than she should run She wanted the Valsartan HCT but I had removed the HCT and she's not more swollen  Orders:  -     Cancel: Comprehensive Metabolic Panel  -     Cancel: Urinalysis, Reflex to Urine Culture  -     Cancel: Lipid Panel  -     Comprehensive Metabolic Panel  -     Lipid Panel  -     Urinalysis, Reflex  to Urine Culture  -     Urinalysis, Microscopic    5. Atherosclerosis of native coronary artery of native heart without angina pectoris  Comments:  Encouraged again to get back to see Dr Moe, she feels good so she has been putting off but I'd rather she have testing before events occur.     6. Diverticular disease    7. Bilateral carotid artery stenosis  Comments:  50-69% on left on US in May I ordered since she had not seen Dr Moe  Overview:  <50% right, 50-69% Right internal      8. Multinodular thyroid  Comments:  Thyroid levels due  Orders:  -     Cancel: TSH  -     TSH    9. Peripheral arterial disease  Comments:  Shows calcification on her xrays of knees, no reported claudication and tries to walk regularly    10. Chronic kidney disease, stage 3a  Comments:  Needs recheck today    11. Post-menopausal  Comments:  Dexa due, she's having more transportation issues but says she'll try to go  Orders:  -     DXA Bone Density Axial Skeleton 1 or more sites; Future; Expected date: 08/29/2023    Other orders  -     azithromycin (Z-CHASIDY) 250 MG tablet; Take 2 tablets by mouth on day 1; Take 1 tablet by mouth on days 2-5  Dispense: 6 tablet; Refill: 0  -     mupirocin (BACTROBAN) 2 % ointment; Apply topically 3 (three) times daily.  Dispense: 30 g; Refill: 1           Medicare Annual Wellness and Personalized Prevention Plan:   Fall Risk + Home Safety + Hearing Impairment + Depression Screen + Opioid and Substance Abuse Screening + Cognitive Impairment Screen + Health Risk Assessment all reviewed.     Health Maintenance Topics with due status: Not Due       Topic Last Completion Date    Influenza Vaccine 10/18/2022    Aspirin/Antiplatelet Therapy 08/22/2023    Lipid Panel 08/22/2023    Hemoglobin A1c 08/22/2023      The patient's Health Maintenance was reviewed and the following appears to be due at this time:   Health Maintenance Due   Topic Date Due    Eye Exam  Never done    TETANUS VACCINE  Never done    DEXA Scan   Never done    Shingles Vaccine (1 of 2) Never done    Diabetes Urine Screening  08/22/2019    Pneumococcal Vaccines (Age 65+) (2 - PPSV23 or PCV20) 02/11/2021    COVID-19 Vaccine (4 - Pfizer series) 10/11/2022       Advance Care Planning   I attest to discussing Advance Care Planning with patient and/or family member.  Education was provided including the importance of the Health Care Power of , Advance Directives, and/or [place] documentation.  The patient expressed understanding to the importance of this information and discussion.     Advance Care Planning     Date: 08/22/2023  Patient did not wish or was not able to name a surrogate decision maker or provide an Advance Care Plan.         Follow up in about 6 months (around 2/22/2024) for Medication Managment. In addition to their scheduled follow up, the patient has also been instructed to follow up on as needed basis.

## 2023-08-22 ENCOUNTER — OFFICE VISIT (OUTPATIENT)
Dept: PRIMARY CARE CLINIC | Facility: CLINIC | Age: 83
End: 2023-08-22
Payer: MEDICARE

## 2023-08-22 VITALS
HEART RATE: 88 BPM | WEIGHT: 142.81 LBS | SYSTOLIC BLOOD PRESSURE: 124 MMHG | RESPIRATION RATE: 16 BRPM | TEMPERATURE: 98 F | HEIGHT: 65 IN | DIASTOLIC BLOOD PRESSURE: 64 MMHG | BODY MASS INDEX: 23.79 KG/M2 | OXYGEN SATURATION: 97 %

## 2023-08-22 DIAGNOSIS — I10 ESSENTIAL HYPERTENSION: ICD-10-CM

## 2023-08-22 DIAGNOSIS — K57.90 DIVERTICULAR DISEASE: ICD-10-CM

## 2023-08-22 DIAGNOSIS — Z00.00 MEDICARE ANNUAL WELLNESS VISIT, SUBSEQUENT: Primary | ICD-10-CM

## 2023-08-22 DIAGNOSIS — I73.9 PERIPHERAL ARTERIAL DISEASE: ICD-10-CM

## 2023-08-22 DIAGNOSIS — I25.10 ATHEROSCLEROSIS OF NATIVE CORONARY ARTERY OF NATIVE HEART WITHOUT ANGINA PECTORIS: ICD-10-CM

## 2023-08-22 DIAGNOSIS — E11.9 WELL CONTROLLED TYPE 2 DIABETES MELLITUS: ICD-10-CM

## 2023-08-22 DIAGNOSIS — N18.31 CHRONIC KIDNEY DISEASE, STAGE 3A: ICD-10-CM

## 2023-08-22 DIAGNOSIS — Z78.0 POST-MENOPAUSAL: ICD-10-CM

## 2023-08-22 DIAGNOSIS — I65.23 BILATERAL CAROTID ARTERY STENOSIS: ICD-10-CM

## 2023-08-22 DIAGNOSIS — D50.0 IRON DEFICIENCY ANEMIA DUE TO CHRONIC BLOOD LOSS: ICD-10-CM

## 2023-08-22 DIAGNOSIS — E04.2 MULTINODULAR THYROID: ICD-10-CM

## 2023-08-22 LAB
ALBUMIN SERPL-MCNC: 3.6 G/DL (ref 3.4–4.8)
ALBUMIN/GLOB SERPL: 1.1 RATIO (ref 1.1–2)
ALP SERPL-CCNC: 68 UNIT/L (ref 40–150)
ALT SERPL-CCNC: 10 UNIT/L (ref 0–55)
APPEARANCE UR: CLEAR
AST SERPL-CCNC: 14 UNIT/L (ref 5–34)
BACTERIA #/AREA URNS AUTO: NORMAL /HPF
BASOPHILS # BLD AUTO: 0.02 X10(3)/MCL
BASOPHILS NFR BLD AUTO: 0.2 %
BILIRUB SERPL-MCNC: 0.3 MG/DL
BILIRUB UR QL STRIP.AUTO: ABNORMAL
BUN SERPL-MCNC: 26.7 MG/DL (ref 9.8–20.1)
CALCIUM SERPL-MCNC: 9 MG/DL (ref 8.4–10.2)
CHLORIDE SERPL-SCNC: 106 MMOL/L (ref 98–107)
CHOLEST SERPL-MCNC: 144 MG/DL
CHOLEST/HDLC SERPL: 3 {RATIO} (ref 0–5)
CO2 SERPL-SCNC: 27 MMOL/L (ref 23–31)
COLOR UR: YELLOW
CREAT SERPL-MCNC: 1.2 MG/DL (ref 0.55–1.02)
EOSINOPHIL # BLD AUTO: 0.2 X10(3)/MCL (ref 0–0.9)
EOSINOPHIL NFR BLD AUTO: 2.3 %
ERYTHROCYTE [DISTWIDTH] IN BLOOD BY AUTOMATED COUNT: 14.6 % (ref 11.5–17)
EST. AVERAGE GLUCOSE BLD GHB EST-MCNC: 125.5 MG/DL
GFR SERPLBLD CREATININE-BSD FMLA CKD-EPI: 45 MLS/MIN/1.73/M2
GLOBULIN SER-MCNC: 3.4 GM/DL (ref 2.4–3.5)
GLUCOSE SERPL-MCNC: 94 MG/DL (ref 82–115)
GLUCOSE UR QL STRIP.AUTO: NEGATIVE
HBA1C MFR BLD: 6 %
HCT VFR BLD AUTO: 31 % (ref 37–47)
HDLC SERPL-MCNC: 54 MG/DL (ref 35–60)
HGB BLD-MCNC: 9.3 G/DL (ref 12–16)
IMM GRANULOCYTES # BLD AUTO: 0.01 X10(3)/MCL (ref 0–0.04)
IMM GRANULOCYTES NFR BLD AUTO: 0.1 %
KETONES UR QL STRIP.AUTO: ABNORMAL
LDLC SERPL CALC-MCNC: 76 MG/DL (ref 50–140)
LEUKOCYTE ESTERASE UR QL STRIP.AUTO: ABNORMAL
LYMPHOCYTES # BLD AUTO: 1.93 X10(3)/MCL (ref 0.6–4.6)
LYMPHOCYTES NFR BLD AUTO: 22.5 %
MCH RBC QN AUTO: 27.9 PG (ref 27–31)
MCHC RBC AUTO-ENTMCNC: 30 G/DL (ref 33–36)
MCV RBC AUTO: 93.1 FL (ref 80–94)
MONOCYTES # BLD AUTO: 0.84 X10(3)/MCL (ref 0.1–1.3)
MONOCYTES NFR BLD AUTO: 9.8 %
NEUTROPHILS # BLD AUTO: 5.59 X10(3)/MCL (ref 2.1–9.2)
NEUTROPHILS NFR BLD AUTO: 65.1 %
NITRITE UR QL STRIP.AUTO: NEGATIVE
PH UR STRIP.AUTO: 5.5 [PH]
PLATELET # BLD AUTO: 329 X10(3)/MCL (ref 130–400)
PMV BLD AUTO: 9.9 FL (ref 7.4–10.4)
POTASSIUM SERPL-SCNC: 4.8 MMOL/L (ref 3.5–5.1)
PROT SERPL-MCNC: 7 GM/DL (ref 5.8–7.6)
PROT UR QL STRIP.AUTO: NEGATIVE
RBC # BLD AUTO: 3.33 X10(6)/MCL (ref 4.2–5.4)
RBC #/AREA URNS AUTO: NORMAL /HPF
RBC UR QL AUTO: NEGATIVE
SODIUM SERPL-SCNC: 141 MMOL/L (ref 136–145)
SP GR UR STRIP.AUTO: 1.02
SQUAMOUS #/AREA URNS AUTO: NORMAL /HPF
TRIGL SERPL-MCNC: 70 MG/DL (ref 37–140)
TSH SERPL-ACNC: 1.32 UIU/ML (ref 0.35–4.94)
UROBILINOGEN UR STRIP-ACNC: 0.2
VLDLC SERPL CALC-MCNC: 14 MG/DL
WBC # SPEC AUTO: 8.59 X10(3)/MCL (ref 4.5–11.5)
WBC #/AREA URNS AUTO: NORMAL /HPF

## 2023-08-22 PROCEDURE — 85025 COMPLETE CBC W/AUTO DIFF WBC: CPT | Performed by: INTERNAL MEDICINE

## 2023-08-22 PROCEDURE — 81001 URINALYSIS AUTO W/SCOPE: CPT | Performed by: INTERNAL MEDICINE

## 2023-08-22 PROCEDURE — 84443 ASSAY THYROID STIM HORMONE: CPT | Performed by: INTERNAL MEDICINE

## 2023-08-22 PROCEDURE — 1159F MED LIST DOCD IN RCRD: CPT | Mod: CPTII,,, | Performed by: INTERNAL MEDICINE

## 2023-08-22 PROCEDURE — 82043 UR ALBUMIN QUANTITATIVE: CPT | Performed by: INTERNAL MEDICINE

## 2023-08-22 PROCEDURE — 3074F SYST BP LT 130 MM HG: CPT | Mod: CPTII,,, | Performed by: INTERNAL MEDICINE

## 2023-08-22 PROCEDURE — G0439 PPPS, SUBSEQ VISIT: HCPCS | Mod: ,,, | Performed by: INTERNAL MEDICINE

## 2023-08-22 PROCEDURE — 1126F AMNT PAIN NOTED NONE PRSNT: CPT | Mod: CPTII,,, | Performed by: INTERNAL MEDICINE

## 2023-08-22 PROCEDURE — 3288F PR FALLS RISK ASSESSMENT DOCUMENTED: ICD-10-PCS | Mod: CPTII,,, | Performed by: INTERNAL MEDICINE

## 2023-08-22 PROCEDURE — 1160F RVW MEDS BY RX/DR IN RCRD: CPT | Mod: CPTII,,, | Performed by: INTERNAL MEDICINE

## 2023-08-22 PROCEDURE — 83036 HEMOGLOBIN GLYCOSYLATED A1C: CPT | Performed by: INTERNAL MEDICINE

## 2023-08-22 PROCEDURE — 1159F PR MEDICATION LIST DOCUMENTED IN MEDICAL RECORD: ICD-10-PCS | Mod: CPTII,,, | Performed by: INTERNAL MEDICINE

## 2023-08-22 PROCEDURE — 3074F PR MOST RECENT SYSTOLIC BLOOD PRESSURE < 130 MM HG: ICD-10-PCS | Mod: CPTII,,, | Performed by: INTERNAL MEDICINE

## 2023-08-22 PROCEDURE — 1101F PR PT FALLS ASSESS DOC 0-1 FALLS W/OUT INJ PAST YR: ICD-10-PCS | Mod: CPTII,,, | Performed by: INTERNAL MEDICINE

## 2023-08-22 PROCEDURE — 3078F PR MOST RECENT DIASTOLIC BLOOD PRESSURE < 80 MM HG: ICD-10-PCS | Mod: CPTII,,, | Performed by: INTERNAL MEDICINE

## 2023-08-22 PROCEDURE — 80053 COMPREHEN METABOLIC PANEL: CPT | Performed by: INTERNAL MEDICINE

## 2023-08-22 PROCEDURE — 1126F PR PAIN SEVERITY QUANTIFIED, NO PAIN PRESENT: ICD-10-PCS | Mod: CPTII,,, | Performed by: INTERNAL MEDICINE

## 2023-08-22 PROCEDURE — 1160F PR REVIEW ALL MEDS BY PRESCRIBER/CLIN PHARMACIST DOCUMENTED: ICD-10-PCS | Mod: CPTII,,, | Performed by: INTERNAL MEDICINE

## 2023-08-22 PROCEDURE — 1124F ACP DISCUSS-NO DSCNMKR DOCD: CPT | Mod: CPTII,,, | Performed by: INTERNAL MEDICINE

## 2023-08-22 PROCEDURE — 36415 COLL VENOUS BLD VENIPUNCTURE: CPT | Performed by: INTERNAL MEDICINE

## 2023-08-22 PROCEDURE — 3078F DIAST BP <80 MM HG: CPT | Mod: CPTII,,, | Performed by: INTERNAL MEDICINE

## 2023-08-22 PROCEDURE — 1101F PT FALLS ASSESS-DOCD LE1/YR: CPT | Mod: CPTII,,, | Performed by: INTERNAL MEDICINE

## 2023-08-22 PROCEDURE — 1124F PR ADV CARE PLAN DISCUSSED, UNABLE/UNWILL DOC PLAN OR SURROGATE: ICD-10-PCS | Mod: CPTII,,, | Performed by: INTERNAL MEDICINE

## 2023-08-22 PROCEDURE — G0439 PR MEDICARE ANNUAL WELLNESS SUBSEQUENT VISIT: ICD-10-PCS | Mod: ,,, | Performed by: INTERNAL MEDICINE

## 2023-08-22 PROCEDURE — 80061 LIPID PANEL: CPT | Performed by: INTERNAL MEDICINE

## 2023-08-22 PROCEDURE — 3288F FALL RISK ASSESSMENT DOCD: CPT | Mod: CPTII,,, | Performed by: INTERNAL MEDICINE

## 2023-08-22 RX ORDER — AZITHROMYCIN 250 MG/1
TABLET, FILM COATED ORAL
Qty: 6 TABLET | Refills: 0 | Status: SHIPPED | OUTPATIENT
Start: 2023-08-22 | End: 2023-08-27

## 2023-08-22 RX ORDER — MUPIROCIN 20 MG/G
OINTMENT TOPICAL 3 TIMES DAILY
Qty: 30 G | Refills: 1 | Status: SHIPPED | OUTPATIENT
Start: 2023-08-22 | End: 2024-03-05 | Stop reason: ALTCHOICE

## 2023-08-22 RX ORDER — ASPIRIN 81 MG/1
81 TABLET ORAL DAILY
COMMUNITY

## 2023-08-22 NOTE — PATIENT INSTRUCTIONS
Patient Education       Diabetic Meal Planning   About this topic   Healthy eating is an important part of keeping your diabetes in control. A balanced diet along with your diabetic drugs will help you control your blood sugar. The right portions of healthy foods may help keep your sugar within your goal range. It may also help to lower or maintain your weight, manage cholesterol, the amount of fat in your blood, and blood pressure.       What will the results be?   Healthy eating may help you lower your blood sugar and keep it in a safe range. Keeping your blood sugar in a safe range may lower your chances for long term problems from your diabetes. You may be less likely to have damage to your kidneys, heart, eyes, or nerves.  What changes to diet are needed?   Healthy eating means:  Eating a range of foods from all food groups.  Eating the right size portions. Read the nutrition facts on each food you eat.  Eating meals and snacks at the same time each day. Do not skip a meal or snack. Skipping meals may cause your blood sugar to go too low, especially if you are on drugs for your diabetes. Skipping meals can also cause you to eat too much at the next meal.  Talk to your diabetes educator about making a personal meal plan for you. They can also help you eat the foods you like by modifying them to be healthier.  Who should use this diet?   This diet is helpful to people with high blood sugar or diabetes.   What foods are good to eat?   It is important to make a healthy meal. Eat a variety of different foods in the right portion.  Fill half of your plate with non-starchy vegetables. Non-starchy vegetables include: Broccoli, green beans, carrots, greens (brett, kale, mustard, turnip), onions, tomatoes, asparagus, beets, cauliflower, celery, and cucumber. Non-starchy vegetables are high in fiber and low in carbohydrates. These will help keep you full for longer without raising your blood sugar.  Fill 1/4 of your  plate with carbohydrates. Try to choose whole grain options. Whole-grain products have more fiber which will make you feel full. Carbohydrates include: Bread, rice, pasta, and starchy vegetables. Starchy vegetables include beans, potatoes, acorn squash, butternut squash, corn, and peas.  Fill 1/4 of your plate with protein. Choose low-fat cuts of meat like boneless, skinless chicken breast; pork loin; 90% lean beef; lean and skinless turkey meat; and fresh fish (not fried) such as tuna, salmon, or mackerel.  Add a low-fat or non-fat dairy product like 8 ounces (240 mL) of 1% or skim milk or 6 ounces (180 mL) of low-fat yogurt. Eat the recommended serving. Milk and yogurt have carbohydrates which raise your blood sugar.  Add a small piece of fruit or 1/2 cup (120 grams) of frozen or canned fruit. Choose canned fruits in juice or water. Fruits include apples, bananas, peaches, pears, pineapple, plums, and oranges. Eat the recommended serving. Fruit has carbohydrates which can raise your blood sugar.  Include healthy fats in your meal like olive oil, canola oil, avocado, and nuts.  Other good foods to include in your diet are:  Foods high in fiber. Adding fiber to your meals may help control your blood sugar and cholesterol levels. It may also help with weight loss and prevent belly problems. If you are younger than 50, it is recommended to get 25 to 38 grams per day. If you are older than 50, it is recommended to get 21 to 30 grams per day. Good sources of fiber include:  Nuts and seeds  Beans, peas, and other legumes  Whole-grain products  Fruits  Vegetables  Smart snacks in the right portion. Do not go too long in between meals. Ask your dietitian when you should have a snack in between your meals. Snack on things like:  Nuts  Vegetables and low-fat dressing  Light popcorn  Low-fat cheese and crackers  1/2 sandwich  What foods should be limited or avoided?   You may need to limit the amount of some foods you eat and  how often you eat them. Foods that should be limited include:  High fat or processed foods like:  Nunes  Sausage  Hot dogs  Whole-fat dairy products  Potato chips  Foods high in sodium like:  Canned soups  Soy sauce and some salad dressings  Cured meats  Salted snack foods like pretzels  Olives  Fats and oils like:  Margarine  Salad dressing  Gravy  Lard or shortening  Foods high in sugar like:  Candy  Cookies  Cake  Ice cream  Table sugar  Soda  Juice drinks  Starches that are not whole grain like:  White rice  French fries  White pasta  White bread  Sugary cereals  Baked goods, pastries, croissants  Beer, wine, and mixed drinks (alcohol). Drink alcohol in moderation (1 drink per day or less for adult women and 2 drinks per day or less for adult men). Drinking alcohol can cause fluctuations in your blood sugar and interfere with how your diabetic drugs work. Talk to your doctor about how you can safely include alcohol into your diet.  What can be done to prevent this health problem?   Some people have a higher chance of developing diabetes than others. This is a life-long problem. You can still lead a normal life. Diabetes can be managed through diet, exercise, and drugs. It is important to have support from your family and friends to help you with your goals.  When do I need to call the doctor?   Blood sugar level is above 240 mg/dL for more than a day  Blood sugar level drops to less than 40 and does not respond to 15 grams of simple carbohydrate, like 4 glucose tablets or 1/2 cup (120 mL) of fruit juice  Trouble breathing  Very sleepy and trouble concentrating  Feeling very thirsty  Needing to urinate (pee) more than normal  Throw up more than once or have many loose stools  You are so sick you cannot eat or drink  Fever over 101°F (38°C)  Questions about your diet plan  You are not feeling better in 2 to 3 days or you are feeling worse  Helpful tips   Plan ahead. Plan your meals and grocery list before going to  the store. This will help you to choose foods that are good for you.  Pack a lunch. Take healthy meals and snacks with you to work.  Keep a food journal to help keep you on track. Take note of foods that keep your blood sugar in goal range. Also note foods and meals that raise or lower your blood sugar. There are apps for your phone that can help with this.  Visit a restaurant's website before eating out. You can see the menu options and nutritional facts. This way, you can see which items best fit into your diet plan. Call ahead if you have questions.  Last Reviewed Date   2021-03-18  Consumer Information Use and Disclaimer   This information is not specific medical advice and does not replace information you receive from your health care provider. This is only a brief summary of general information. It does NOT include all information about conditions, illnesses, injuries, tests, procedures, treatments, therapies, discharge instructions or life-style choices that may apply to you. You must talk with your health care provider for complete information about your health and treatment options. This information should not be used to decide whether or not to accept your health care providers advice, instructions or recommendations. Only your health care provider has the knowledge and training to provide advice that is right for you.   Copyright   Copyright © 2021 [employer] and its affiliates and/or licensors. All rights reserved.

## 2023-08-23 LAB
CREAT UR-MCNC: 184.5 MG/DL (ref 45–106)
MICROALBUMIN UR-MCNC: 9.1 UG/ML
MICROALBUMIN/CREAT RATIO PNL UR: 4.9 MG/GM CR (ref 0–30)

## 2023-08-28 ENCOUNTER — TELEPHONE (OUTPATIENT)
Dept: PRIMARY CARE CLINIC | Facility: CLINIC | Age: 83
End: 2023-08-28
Payer: MEDICARE

## 2023-08-28 NOTE — TELEPHONE ENCOUNTER
----- Message from Idania Phan MD sent at 8/23/2023  6:58 PM CDT -----  Diabetes is great, kidney shows damage, it's partly she's dry so more water. Blood is a little more anemic, keep follow up with Dr Henson.thyroid and cholesterol look great. Copy to Dr Moe

## 2023-10-31 RX ORDER — METFORMIN HYDROCHLORIDE 500 MG/1
TABLET ORAL
Qty: 60 TABLET | Refills: 3 | Status: SHIPPED | OUTPATIENT
Start: 2023-10-31 | End: 2024-02-29

## 2023-10-31 RX ORDER — CARVEDILOL 12.5 MG/1
TABLET ORAL
Qty: 60 TABLET | Refills: 3 | Status: SHIPPED | OUTPATIENT
Start: 2023-10-31 | End: 2024-02-29

## 2023-10-31 RX ORDER — CLOPIDOGREL BISULFATE 75 MG/1
TABLET ORAL
Qty: 30 TABLET | Refills: 3 | Status: SHIPPED | OUTPATIENT
Start: 2023-10-31 | End: 2024-02-29

## 2023-10-31 RX ORDER — SIMVASTATIN 20 MG/1
TABLET, FILM COATED ORAL
Qty: 30 TABLET | Refills: 3 | Status: SHIPPED | OUTPATIENT
Start: 2023-10-31 | End: 2024-02-29

## 2023-10-31 RX ORDER — AMLODIPINE BESYLATE 10 MG/1
TABLET ORAL
Qty: 30 TABLET | Refills: 3 | Status: SHIPPED | OUTPATIENT
Start: 2023-10-31 | End: 2024-02-29

## 2023-11-30 RX ORDER — VALSARTAN 160 MG/1
160 TABLET ORAL
Qty: 30 TABLET | Refills: 5 | Status: SHIPPED | OUTPATIENT
Start: 2023-11-30

## 2024-02-15 ENCOUNTER — TELEPHONE (OUTPATIENT)
Dept: PRIMARY CARE CLINIC | Facility: CLINIC | Age: 84
End: 2024-02-15
Payer: MEDICARE

## 2024-02-15 NOTE — TELEPHONE ENCOUNTER
Tried to confirm appt   NA, NO VM X2    QUALITY-DO NOT ASK PATIENT  -PNEUMONIA  13:12/17/20  20:-  23:-    -COLON:11/8/13    -MMG: -    -PAP SMEAR:-    -DEXA:-    -DIABETES SCREEN  A1C:8/22/23  MICRO UA:8/22/23  EYE EXAM:-  FOOT EXAM:8/22/23

## 2024-02-27 ENCOUNTER — TELEPHONE (OUTPATIENT)
Dept: PRIMARY CARE CLINIC | Facility: CLINIC | Age: 84
End: 2024-02-27
Payer: MEDICARE

## 2024-02-29 RX ORDER — CLOPIDOGREL BISULFATE 75 MG/1
TABLET ORAL
Qty: 30 TABLET | Refills: 3 | Status: SHIPPED | OUTPATIENT
Start: 2024-02-29

## 2024-02-29 RX ORDER — METFORMIN HYDROCHLORIDE 500 MG/1
TABLET ORAL
Qty: 60 TABLET | Refills: 3 | Status: SHIPPED | OUTPATIENT
Start: 2024-02-29

## 2024-02-29 RX ORDER — SIMVASTATIN 20 MG/1
TABLET, FILM COATED ORAL
Qty: 30 TABLET | Refills: 3 | Status: SHIPPED | OUTPATIENT
Start: 2024-02-29

## 2024-02-29 RX ORDER — CARVEDILOL 12.5 MG/1
TABLET ORAL
Qty: 60 TABLET | Refills: 3 | Status: SHIPPED | OUTPATIENT
Start: 2024-02-29

## 2024-02-29 RX ORDER — AMLODIPINE BESYLATE 10 MG/1
TABLET ORAL
Qty: 30 TABLET | Refills: 3 | Status: SHIPPED | OUTPATIENT
Start: 2024-02-29

## 2024-03-05 ENCOUNTER — OFFICE VISIT (OUTPATIENT)
Dept: PRIMARY CARE CLINIC | Facility: CLINIC | Age: 84
End: 2024-03-05
Payer: MEDICARE

## 2024-03-05 VITALS
BODY MASS INDEX: 25.33 KG/M2 | DIASTOLIC BLOOD PRESSURE: 72 MMHG | WEIGHT: 152 LBS | SYSTOLIC BLOOD PRESSURE: 146 MMHG | TEMPERATURE: 97 F | OXYGEN SATURATION: 97 % | HEIGHT: 65 IN | HEART RATE: 70 BPM

## 2024-03-05 DIAGNOSIS — I10 ESSENTIAL HYPERTENSION: ICD-10-CM

## 2024-03-05 DIAGNOSIS — Z59.82 TRANSPORTATION INSECURITY: ICD-10-CM

## 2024-03-05 DIAGNOSIS — I25.10 CORONARY ARTERY DISEASE INVOLVING NATIVE CORONARY ARTERY OF NATIVE HEART WITHOUT ANGINA PECTORIS: ICD-10-CM

## 2024-03-05 DIAGNOSIS — E11.9 WELL CONTROLLED TYPE 2 DIABETES MELLITUS: Primary | ICD-10-CM

## 2024-03-05 DIAGNOSIS — D50.0 IRON DEFICIENCY ANEMIA DUE TO CHRONIC BLOOD LOSS: ICD-10-CM

## 2024-03-05 DIAGNOSIS — I73.9 PERIPHERAL ARTERIAL DISEASE: ICD-10-CM

## 2024-03-05 DIAGNOSIS — N18.31 CHRONIC KIDNEY DISEASE, STAGE 3A: ICD-10-CM

## 2024-03-05 PROCEDURE — 3078F DIAST BP <80 MM HG: CPT | Mod: CPTII,,, | Performed by: INTERNAL MEDICINE

## 2024-03-05 PROCEDURE — 1159F MED LIST DOCD IN RCRD: CPT | Mod: CPTII,,, | Performed by: INTERNAL MEDICINE

## 2024-03-05 PROCEDURE — 1160F RVW MEDS BY RX/DR IN RCRD: CPT | Mod: CPTII,,, | Performed by: INTERNAL MEDICINE

## 2024-03-05 PROCEDURE — 99214 OFFICE O/P EST MOD 30 MIN: CPT | Mod: ,,, | Performed by: INTERNAL MEDICINE

## 2024-03-05 PROCEDURE — 1126F AMNT PAIN NOTED NONE PRSNT: CPT | Mod: CPTII,,, | Performed by: INTERNAL MEDICINE

## 2024-03-05 PROCEDURE — 36415 COLL VENOUS BLD VENIPUNCTURE: CPT | Mod: ,,, | Performed by: INTERNAL MEDICINE

## 2024-03-05 PROCEDURE — 3077F SYST BP >= 140 MM HG: CPT | Mod: CPTII,,, | Performed by: INTERNAL MEDICINE

## 2024-03-05 PROCEDURE — 1101F PT FALLS ASSESS-DOCD LE1/YR: CPT | Mod: CPTII,,, | Performed by: INTERNAL MEDICINE

## 2024-03-05 PROCEDURE — 3288F FALL RISK ASSESSMENT DOCD: CPT | Mod: CPTII,,, | Performed by: INTERNAL MEDICINE

## 2024-03-05 RX ORDER — HYDRALAZINE HYDROCHLORIDE 50 MG/1
50 TABLET, FILM COATED ORAL 3 TIMES DAILY
COMMUNITY
Start: 2024-01-09

## 2024-03-05 SDOH — SOCIAL DETERMINANTS OF HEALTH (SDOH): TRANSPORTATION INSECURITY: Z59.82

## 2024-03-05 NOTE — PATIENT INSTRUCTIONS
DASH Diet   About this topic   DASH stands for Dietary Approaches to Stop Hypertension. The DASH diet may help you lower blood pressure. It may also help keep you from getting high blood pressure. You will eat less fat and more fiber on the DASH diet.  This diet gives you more minerals that fight high blood pressure. Some nutrients in this diet are:  Potassium ? Acts to help you get rid of salt. This may help to lower blood pressure.  Calcium ? Makes blood vessels and muscles work the right way  Magnesium - Helps blood vessels relax  Fiber ? Helps you feel full. It also helps digestion.  What will the results be?   The DASH diet may help you:  Lower your blood pressure and cholesterol  Lower your risk for cancer, heart disease, heart attack, and stroke. It may also lower your risk for heart failure, kidney stones, and diabetes.  Lose weight or keep a healthy weight  What lifestyle changes are needed?   Add regular exercise to get the most help from this diet.  Try to lower stress. Find ways to relax.  Stop smoking. Avoid secondhand smoke.  Limit alcohol intake.  What changes to diet are needed?   Know about poor eating habits. Then, you can fix them as you work with the program.  This diet encourages fruits and vegetables, whole grains, lean meats, healthy fats, and low-fat or fat-free dairy products.  This diet is lower in saturated fats, trans-fats, cholesterol, added sugars, and sodium.  Who should use this diet?   This eating plan is good for the whole family. It is also good for people with high blood pressure and those at risk for high blood pressure.  What foods are good to eat?   Grains: Try to eat 6 to 8 servings of whole grain, high fiber foods each day. These are bread, cereals, brown rice, or pasta.  Fruits and vegetables: Eat 4 to 5 servings each day. Try to pick many kinds and colors. Fresh or frozen are best. Look for low sodium or salt-free if you choose canned.  Dairy: Try to eat 2 to 3 servings of  fat free and low fat milk products each day.  Lean meats, poultry, and seafood: Try to eat 6 servings or less of lean meats, poultry, and seafood each day. Try to choose more low fat or lean meats like chicken and turkey. Eat less red meat. Eat more fish instead.  Nuts, seeds, and legumes (dry beans and peas): Try to eat 4 to 5 servings each week. Try to pick nuts such as almonds and walnuts, sunflower seeds, peanut butter, soy beans, lentils, kidney beans, and split peas.  Fats and oils: Try to eat 2 to 3 servings of fats and oils each day. Eat good fats found in fish, nuts, and avocados. Try using olive oil or vegetable oils such as canola oil. Other good oils to try are corn, safflower, sunflower, or soybean oils. Use low-sodium and low-fat salad dressing and mayonnaise.  Condiments: Pepper, herbs, spices, vinegar, lemon or lime juices are great for seasoning. Be careful to choose low-sodium or salt-free products if you use broths, soups, or soy sauce.  Sweets: Try to eat less than 5 servings each week. Choose low-fat and trans fat-free desserts. These are things like fruit flavored gelatin, sorbet, jelly beans, nighat crackers, animal crackers, low-fat fig bars, and mirella snaps. Eat fruit to satisfy your desire for sweets.     What foods should be limited or avoided?   Grains: Salted breads, rolls, crackers, quick breads, self-rising flours, biscuit mixes, regular bread crumbs, instant hot cereals, commercially-prepared rice, pasta, stuffing mixes  Fruits and vegetables: Commercially-prepared potatoes and vegetable mixes, regular canned vegetables and juices, vegetables frozen with sauce or pickled vegetables, processed fruits with salt or sodium  Milk: Whole milk, malted milk, chocolate milk, buttermilk, cheese, ice cream  Meats and beans: Smoked, cured, salted, or canned fish; meats or poultry such as ryan, sausages, sardines; high-fat cuts of meat like beef, lamb, or pork; chicken with the skin on it  Fats:  Cut back on solid fats like butter, lard, and margarine. Eat less food with high saturated fat, cholesterol and total fat.  Condiments and snacks: Salted and canned peas, beans, and olives; salted snack foods; fried foods; soda or other sweetened drinks  Sweets: High-fat baked goods such as muffins, donuts, pastries, commercial baked goods, candy bars  If you choose to drink alcohol, limit the amount you drink. Women should have 1 drink or less per day and men should have 2 drinks or less per day.  Helpful tips   Avoid eating canned vegetables and processed foods. These have a lot of salt in them. Look for a low-salt or low-sodium choice.  Try baking or broiling instead of frying food.  Write down the foods you eat. This will help you track what you have eaten each week.  When you go to a grocery store, have a list or a meal plan. Do not shop when you are hungry to avoid cravings for foods.  Read food labels with care. They will show you how much is in a serving. The amount is given as a percentage of the total amount you need each day. Reading labels will help you make healthy food choices.       Avoid fast foods.  Talk to your doctor or dietitian to see if you need vitamin and mineral supplements to help you balance your diet.  Talk to a dietitian for help.  Where can I learn more?   Academy of [employer] and Dietetics  https://www.eatright.org/health/wellness/heart-and-cardiovascular-health/dash-diet-reducing-hypertension-through-diet-and-lifestyle   FamilyDoctor.org  http://familydoctor.org/familydoctor/en/prevention-wellness/food-nutrition/weight-loss/the-dash-diet-healthy-eating-to-control-your-blood-pressure.html   Last Reviewed Date   2021-03-15  Consumer Information Use and Disclaimer   This information is not specific medical advice and does not replace information you receive from your health care provider. This is only a brief summary of general information. It does NOT include all information about  conditions, illnesses, injuries, tests, procedures, treatments, therapies, discharge instructions or life-style choices that may apply to you. You must talk with your health care provider for complete information about your health and treatment options. This information should not be used to decide whether or not to accept your health care providers advice, instructions or recommendations. Only your health care provider has the knowledge and training to provide advice that is right for you.  Copyright   Copyright © 2021 UpToDate, Inc. and its affiliates and/or licensors. All rights reserved.         Transportation Needs: Patient Declined (4/18/2023)    PRAPARE - Transportation     Lack of Transportation (Medical): Patient declined     Lack of Transportation (Non-Medical): Patient declined     She believes she wants to stay with family and not deal with strangers.

## 2024-03-05 NOTE — PROGRESS NOTES
Idania Phan MD   [id number] [address]     Patient ID: [id number]     Chief Complaint: 6 Months follow up for DM        HPI:     Lynda Mendoza is a 83 y.o. female here today for a follow up of diabetes, CHF, HTN, carotid stenosis, CKD3a, multinodular thyroid and PAD . She is having trouble getting transportation to doctor visits. She called 9 people working on a ride to my visit. She tried to get one to [facility] the same day but couldn't get him set up.       Subjective:     Review of Systems   Constitutional:         She had poor energy when she took the diuretic.   HENT:          Ear wax is worrying her, she pulled some out with her finger   Respiratory: Negative.     Cardiovascular:         She brought her log and her sugars and BP have generally been good, she occasionally sees 150's on B/P   Genitourinary:  Positive for frequency.        She worries that her BP medication has a diuretic       Past Medical History:   Diagnosis Date    Acute renal failure     Bilateral carotid artery stenosis 8/21/2023    <50% right, 50-69% Right internal    CAD (coronary artery disease)     Diabetes mellitus, type 2     Diverticulosis     HTN (hypertension)     Iron deficiency     Macrocytic anemia     MI (myocardial infarction)         Past Surgical History:   Procedure Laterality Date    BREAST LUMPECTOMY Left     NASAL SEPTOPLASTY      SKIN TAG REMOVAL      SURGICAL REMOVAL OF NODULE OF VOCAL CORD         Family History   Problem Relation Age of Onset    Heart disease Father     Dementia Sister     Heart failure Sister         Social History     Socioeconomic History    Marital status:    Tobacco Use    Smoking status: Never    Smokeless tobacco: Never   Substance and Sexual Activity    Alcohol use: Not Currently    Drug use: Never     Social Determinants of Health     Financial Resource Strain: Patient Declined (4/18/2023)    Overall Financial Resource Strain (CARDIA)     Difficulty of Paying Living Expenses:  Patient declined   Food Insecurity: Patient Declined (4/18/2023)    Hunger Vital Sign     Worried About Running Out of Food in the Last Year: Patient declined     Ran Out of Food in the Last Year: Patient declined   Transportation Needs: Patient Declined (4/18/2023)    PRAPARE - Transportation     Lack of Transportation (Medical): Patient declined     Lack of Transportation (Non-Medical): Patient declined   Physical Activity: Patient Declined (4/18/2023)    Exercise Vital Sign     Days of Exercise per Week: Patient declined     Minutes of Exercise per Session: Patient declined   Stress: Patient Declined (4/18/2023)    [employer] - Occupational Stress Questionnaire     Feeling of Stress : Patient declined   Social Connections: Patient Declined (4/18/2023)    Social Connection and Isolation Panel [NHANES]     Frequency of Communication with Friends and Family: Patient declined     Frequency of Social Gatherings with Friends and Family: Patient declined     Attends Mu-ism Services: Patient declined     Active Member of Clubs or Organizations: Patient declined     Attends Club or Organization Meetings: Patient declined     Marital Status: Patient declined   Housing Stability: Unknown (4/18/2023)    Housing Stability Vital Sign     Unable to Pay for Housing in the Last Year: Patient refused     Unstable Housing in the Last Year: Patient refused       Review of patient's allergies indicates:   Allergen Reactions    Penicillins        Outpatient Medications Marked as Taking for the 3/5/24 encounter (Office Visit) with Idania Phan MD   Medication Sig Dispense Refill    amLODIPine (NORVASC) 10 MG tablet TAKE ONE TABLET BY MOUTH EVERY DAY FOR BLOOD PRESSURE NORVASC GENERIC 30 tablet 3    aspirin (ECOTRIN) 81 MG EC tablet Take 81 mg by mouth once daily.      carvediloL (COREG) 12.5 MG tablet TAKE ONE TABLET BY MOUTH TWICE A DAY GENERIC COREG 60 tablet 3    cholecalciferol, vitamin D3,  "(VITAMIN D3 ORAL) Take 100 mcg by mouth once daily.      clopidogreL (PLAVIX) 75 mg tablet TAKE ONE TABLET BY MOUTH EVERY DAY FOR BLOOD GENERIC PLAVIX 30 tablet 3    hydrALAZINE (APRESOLINE) 25 MG tablet Take 25 mg by mouth 3 (three) times daily. Taking 75 mg tid      hydrALAZINE (APRESOLINE) 50 MG tablet Take 50 mg by mouth 3 (three) times daily. TAKING WITH THE 25MG      metFORMIN (GLUCOPHAGE) 500 MG tablet TAKE ONE TABLET BY MOUTH TWICE A DAY WITH MEALS FOR DIABETES 60 tablet 3    simvastatin (ZOCOR) 20 MG tablet TAKE ONE TABLET BY MOUTH AT BEDTIME FOR CHOLESTEROL GENERIC ZOCOR 30 tablet 3    valsartan (DIOVAN) 160 MG tablet TAKE ONE TABLET BY MOUTH DAILY 30 tablet 5    vitamin E 100 UNIT capsule Take 100 Units by mouth once a week.         Patient Care Team:  Idania Phan MD as PCP - General (Internal Medicine)  Morgan Law MD (Cardiovascular Disease)       Objective:     BP (!) 146/72   Pulse 70   Temp 96.8 °F (36 °C) (Oral)   Ht 5' 5" (1.651 m)   Wt 68.9 kg (152 lb)   SpO2 97%   BMI 25.29 kg/m²     Physical Exam  Vitals reviewed.   HENT:      Head: Normocephalic and atraumatic.      Ears:      Comments: Minimal wax bilateral     Mouth/Throat:      Mouth: Mucous membranes are moist.   Eyes:      General: No scleral icterus.     Extraocular Movements: Extraocular movements intact.      Conjunctiva/sclera: Conjunctivae normal.   Cardiovascular:      Rate and Rhythm: Normal rate and regular rhythm.      Heart sounds: Murmur heard.   Pulmonary:      Effort: Pulmonary effort is normal.      Breath sounds: Normal breath sounds.   Abdominal:      General: Bowel sounds are normal.      Palpations: Abdomen is soft.      Tenderness: There is no abdominal tenderness. There is no guarding.   Skin:     General: Skin is warm and dry.   Neurological:      Mental Status: She is alert and oriented to person, place, and time.      Motor: No weakness.      Gait: Gait normal.   Psychiatric:         Mood and " Affect: Mood normal.         Behavior: Behavior normal.         Thought Content: Thought content normal.         Judgment: Judgment normal.             Lab Results   Component Value Date    HGBA1C 6.0 08/22/2023     Cholesterol Total   Date Value Ref Range Status   08/22/2023 144 <=200 mg/dL Final     HDL Cholesterol   Date Value Ref Range Status   08/22/2023 54 35 - 60 mg/dL Final     Triglyceride   Date Value Ref Range Status   08/22/2023 70 37 - 140 mg/dL Final       Assessment/Plan:     1. Well controlled type 2 diabetes mellitus  Comments:  Due lab  Orders:  -     Basic Metabolic Panel  -     Hemoglobin A1C    2. Essential hypertension  Comments:  Elevated today but log show good  readings at home    3. Coronary artery disease involving native coronary artery of native heart without angina pectoris  Comments:  Having trouble getting to Dr Moe for follow up    4. Chronic kidney disease, stage 3a  Comments:  Rechecking today    5. Peripheral arterial disease  Comments:  No reports of claudication  Overview:  Arterial calcifications on xrays of knees seen in 2017      6. Iron deficiency anemia due to chronic blood loss  Comments:  Recheck levels, she did go to [facility] but does not plan to return, she does not thinks he has any anemia  Orders:  -     CBC Auto Differential  -     Iron and TIBC    7. Transportation insecurity  Comments:  She is not driving and family has not been as dependable. She has looked into transportation services but cost has prohibited Discussion 5 min             Follow up in about 6 months (around 8/28/2024) for Wellness. In addition to their scheduled follow up, the patient has also been instructed to follow up on as needed basis.     Signature:  Idania Phan MD  [facility]  [id number] [address]

## 2024-03-06 ENCOUNTER — TELEPHONE (OUTPATIENT)
Dept: PRIMARY CARE CLINIC | Facility: CLINIC | Age: 84
End: 2024-03-06
Payer: MEDICARE

## 2024-03-06 LAB
BASOPHILS # BLD AUTO: 0.1 X10E3/UL (ref 0–0.2)
BASOPHILS NFR BLD AUTO: 1 %
BUN SERPL-MCNC: 19 MG/DL (ref 8–27)
BUN/CREAT SERPL: 22 (ref 12–28)
CALCIUM SERPL-MCNC: 9.1 MG/DL (ref 8.7–10.3)
CHLORIDE SERPL-SCNC: 103 MMOL/L (ref 96–106)
CO2 SERPL-SCNC: 26 MMOL/L (ref 20–29)
CREAT SERPL-MCNC: 0.88 MG/DL (ref 0.57–1)
EOSINOPHIL # BLD AUTO: 0.2 X10E3/UL (ref 0–0.4)
EOSINOPHIL NFR BLD AUTO: 2 %
ERYTHROCYTE [DISTWIDTH] IN BLOOD BY AUTOMATED COUNT: 14.8 % (ref 11.7–15.4)
EST. GFR  (NO RACE VARIABLE): 64 ML/MIN/1.73
GLUCOSE SERPL-MCNC: 85 MG/DL (ref 70–99)
HBA1C MFR BLD: 6.1 % (ref 4.8–5.6)
HCT VFR BLD AUTO: 33.4 % (ref 34–46.6)
HGB BLD-MCNC: 10.3 G/DL (ref 11.1–15.9)
IMM GRANULOCYTES NFR BLD AUTO: 0 %
IRON SATN MFR SERPL: 11 % (ref 15–55)
IRON SERPL-MCNC: 39 UG/DL (ref 27–139)
LYMPHOCYTES # BLD AUTO: 1.8 X10E3/UL (ref 0.7–3.1)
LYMPHOCYTES NFR BLD AUTO: 21 %
MCH RBC QN AUTO: 29 PG (ref 26.6–33)
MCHC RBC AUTO-ENTMCNC: 30.8 G/DL (ref 31.5–35.7)
MCV RBC AUTO: 94 FL (ref 79–97)
MONOCYTES # BLD AUTO: 0.8 X10E3/UL (ref 0.1–0.9)
MONOCYTES NFR BLD AUTO: 10 %
NEUTROPHILS # BLD AUTO: 5.6 X10E3/UL (ref 1.4–7)
NEUTROPHILS NFR BLD AUTO: 66 %
PLATELET # BLD AUTO: 294 X10E3/UL (ref 150–450)
POTASSIUM SERPL-SCNC: 4.3 MMOL/L (ref 3.5–5.2)
RBC # BLD AUTO: 3.55 X10E6/UL (ref 3.77–5.28)
SODIUM SERPL-SCNC: 143 MMOL/L (ref 134–144)
TIBC SERPL-MCNC: 344 UG/DL (ref 250–450)
UIBC SERPL-MCNC: 305 UG/DL (ref 118–369)
WBC # BLD AUTO: 8.5 X10E3/UL (ref 3.4–10.8)

## 2024-03-06 NOTE — TELEPHONE ENCOUNTER
Spoke with patient. Patient verbalized understanding of results. Patient states that she drinks a gallon of water a day between meals and that's the most she can do , she also states that she uses the restroom 4-5 times at night but knows its because of the diuretic pills. She says she is very pleased of our professional service .

## 2024-03-06 NOTE — TELEPHONE ENCOUNTER
----- Message from Idania Phan MD sent at 3/6/2024  1:05 PM CST -----  Anemia is better, iron is low normal so keep doing what she's doing. The sugar is good so no changes there.  The kidney is a little worse with part of it dehydration so keep her fluid going, Stay away from Advil/Aleve or any arthritis medication other than Tylenol. Send a copy to Dr Moe.

## 2024-04-11 ENCOUNTER — PATIENT OUTREACH (OUTPATIENT)
Dept: ADMINISTRATIVE | Facility: HOSPITAL | Age: 84
End: 2024-04-11
Payer: MEDICARE

## 2024-04-11 DIAGNOSIS — Z59.82 TRANSPORTATION INSECURITY: Primary | ICD-10-CM

## 2024-04-11 SDOH — SOCIAL DETERMINANTS OF HEALTH (SDOH): TRANSPORTATION INSECURITY: Z59.82

## 2024-04-11 NOTE — Clinical Note
I just sent a [facility] referral for this patient she has been having so much transportation and food insecurity issues . It is even charted by her Dr in her last visit. She is such a sweet lady .  Her Dr is in  program - Dr Phan

## 2024-04-11 NOTE — PROGRESS NOTES
Health Maintenance Topic(s) Outreach Outcomes & Actions Taken:      Patient   Osteoporosis Screening - Outreach Outcomes & Actions Taken  : patient states hard to get rides/ chw referral sent                                     Additional Notes:           Care Management, Digital Medicine, and/or Education Referrals      Next Steps - Referral Actions: Digital Medicine Outcomes and Actions Taken: declined and Referral to [facility] sent Transportation, Food , and financial insecurities

## 2024-04-12 ENCOUNTER — PATIENT OUTREACH (OUTPATIENT)
Dept: ADMINISTRATIVE | Facility: OTHER | Age: 84
End: 2024-04-12
Payer: MEDICARE

## 2024-04-18 NOTE — PROGRESS NOTES
CHW - Initial Contact    This Community Health Worker completed the Social Determinant of Health questionnaire with patient via telephone today.    Pt was referred to [facility] by the [facility] Outreach Program for transportation and food  Pt and I called [facility] and spoke with Phylicia.   Pt and I agreed to follow up in a few days to confirm pt is all set up.     Follow-up Outreach - Due: 4/29/2024

## 2024-04-26 ENCOUNTER — PATIENT OUTREACH (OUTPATIENT)
Dept: ADMINISTRATIVE | Facility: OTHER | Age: 84
End: 2024-04-26
Payer: MEDICARE

## 2024-04-26 NOTE — PROGRESS NOTES
CHW - Follow Up and Case Closure    This Community Health Worker completed a follow up visit with patient via telephone today.  Took pt a few times to answer the call thinking I was a SPAM call. Pt and I confirmed that she has the correct number for [facility] with [phone].  Pt denied any additional needs at this time and agrees with episode closure at this time.  Provided patient with Community Health Worker's contact information and encouraged her to contact this Community Health Worker if additional needs arise.

## 2024-05-21 RX ORDER — VALSARTAN 160 MG/1
160 TABLET ORAL
Qty: 30 TABLET | Refills: 5 | Status: SHIPPED | OUTPATIENT
Start: 2024-05-21

## 2024-06-20 RX ORDER — CARVEDILOL 12.5 MG/1
TABLET ORAL
Qty: 60 TABLET | Refills: 3 | Status: SHIPPED | OUTPATIENT
Start: 2024-06-20

## 2024-06-20 RX ORDER — AMLODIPINE BESYLATE 10 MG/1
TABLET ORAL
Qty: 30 TABLET | Refills: 3 | Status: SHIPPED | OUTPATIENT
Start: 2024-06-20

## 2024-06-20 RX ORDER — CLOPIDOGREL BISULFATE 75 MG/1
TABLET ORAL
Qty: 30 TABLET | Refills: 3 | Status: SHIPPED | OUTPATIENT
Start: 2024-06-20

## 2024-06-20 RX ORDER — SIMVASTATIN 20 MG/1
TABLET, FILM COATED ORAL
Qty: 30 TABLET | Refills: 3 | Status: SHIPPED | OUTPATIENT
Start: 2024-06-20

## 2024-06-20 RX ORDER — METFORMIN HYDROCHLORIDE 500 MG/1
TABLET ORAL
Qty: 60 TABLET | Refills: 3 | Status: SHIPPED | OUTPATIENT
Start: 2024-06-20

## 2024-07-18 ENCOUNTER — PATIENT OUTREACH (OUTPATIENT)
Facility: CLINIC | Age: 84
End: 2024-07-18
Payer: MEDICARE

## 2024-07-18 NOTE — PROGRESS NOTES
Health Maintenance Topic(s) Outreach Outcomes & Actions Taken:    Primary Care Appt - Outreach Outcomes & Actions Taken  : Primary Care Appt Scheduled and Patient has Primary Care wellness on 8/27/2024    Blood Pressure - Outreach Outcomes & Actions Taken  : Primary Care Follow Up Visit Scheduled  and Patient has wellness with Primary Care on8/27/2024                                     Additional Notes:           Care Management, Digital Medicine, and/or Education Referrals      Next Steps - Referral Actions: No referrals sent CHW tried to reach patient several times and no answer

## 2024-08-26 ENCOUNTER — TELEPHONE (OUTPATIENT)
Dept: PRIMARY CARE CLINIC | Facility: CLINIC | Age: 84
End: 2024-08-26
Payer: MEDICARE

## 2024-08-26 DIAGNOSIS — E04.2 MULTINODULAR THYROID: ICD-10-CM

## 2024-08-26 DIAGNOSIS — D50.0 IRON DEFICIENCY ANEMIA DUE TO CHRONIC BLOOD LOSS: ICD-10-CM

## 2024-08-26 DIAGNOSIS — E11.9 WELL CONTROLLED TYPE 2 DIABETES MELLITUS: ICD-10-CM

## 2024-08-26 DIAGNOSIS — R82.81 PYURIA: ICD-10-CM

## 2024-08-26 DIAGNOSIS — N18.31 CHRONIC KIDNEY DISEASE, STAGE 3A: Primary | ICD-10-CM

## 2024-08-26 NOTE — TELEPHONE ENCOUNTER
----- Message from Windy Snowden sent at 8/26/2024  2:09 PM CDT -----  Regarding: wellness  .Caller is requesting to schedule their Lab appointment prior to annual appointment.    Name of Caller:pt    Preferred Date and Time of Labs:    Date of EPP Appointment:9/17    Where would they like the lab performed?    Would the patient rather a call back or a response via [facility]? yovany    Best Call Back Number: [phone]    Additional Information:

## 2024-09-16 NOTE — PATIENT INSTRUCTIONS
Uriah Howell,     If you are due for any health screening(s) below please notify me so we can arrange them to be ordered and scheduled. Most healthy patients at your age complete them, but you are free to accept or refuse.     If you can't do it, I'll definitely understand. If you can, I'd certainly appreciate it!    Tests to Keep You Healthy    Eye Exam: DUE  Last Blood Pressure <= 139/89 (3/5/2024): NO      Your diabetic retinal eye exam is due     Diabetes is the #1 cause of blindness in the [place] - early detection before signs or symptoms develop can prevent debilitating blindness.     Our records indicate that you may be overdue for your annual diabetic eye exam. Eye screening can help identify patients at risk for developing vision loss which is common in diabetes. This simple screening is an important step to keeping you healthy and preventing complications from diabetes.     This recommended diabetic eye exam should take place once per year and can prevent and treat diabetes complications in the eye before developing symptoms. This can be done with a special camera is used to take photographs of the back of your eye without having to dilate them, or you can see an eye doctor for a full dilated exam.     If you recently had your yearly diabetic eye exam performed outside of [facility], please let your Health care team know so that they can update your health record.       5-10 year preventative plan discussed

## 2024-09-16 NOTE — PROGRESS NOTES
Patient ID: [id number]     Chief Complaint: Medicare AWV (Complain of frequent urination.)      HPI:     Lynda Mendoza is a 83 y.o. female here today for a Medicare Wellness. She went to the heart doctor but never saw him. She is doing well.       Opioid Screening: Patient medication list reviewed, patient is not taking prescription opioids. Patient is not using additional opioids than prescribed. Patient is at low risk of substance abuse based on this opioid use history.       Past Medical History:   Diagnosis Date    Acute renal failure     Bilateral carotid artery stenosis 08/21/2023    <50% right, 50-69% Right internal    CAD (coronary artery disease)     Chronic kidney disease (CKD)     Diabetes mellitus, type 2     Diverticulosis     HTN (hypertension)     Iron deficiency     Macrocytic anemia     MI (myocardial infarction)         Past Surgical History:   Procedure Laterality Date    BREAST LUMPECTOMY Left     NASAL SEPTOPLASTY      SKIN TAG REMOVAL      SURGICAL REMOVAL OF NODULE OF VOCAL CORD         Review of patient's allergies indicates:   Allergen Reactions    Penicillins        Outpatient Medications Marked as Taking for the 9/17/24 encounter (Office Visit) with Idania Phan MD   Medication Sig Dispense Refill    amLODIPine (NORVASC) 10 MG tablet TAKE ONE TABLET BY MOUTH EVERY DAY FOR BLOOD PRESSURE NORVASC GENERIC 30 tablet 3    aspirin (ECOTRIN) 81 MG EC tablet Take 81 mg by mouth once daily.      carvediloL (COREG) 12.5 MG tablet TAKE ONE TABLET BY MOUTH TWICE A DAY GENERIC COREG 60 tablet 3    cholecalciferol, vitamin D3, (VITAMIN D3 ORAL) Take 100 mcg by mouth 3 (three) times a week.      clopidogreL (PLAVIX) 75 mg tablet TAKE ONE TABLET BY MOUTH EVERY DAY FOR BLOOD GENERIC PLAVIX 30 tablet 3    hydrALAZINE (APRESOLINE) 25 MG tablet Take 25 mg by mouth 3 (three) times daily. Taking 75 mg tid      hydrALAZINE (APRESOLINE) 50 MG tablet Take 50 mg by mouth 3 (three) times daily. TAKING WITH THE  25MG      metFORMIN (GLUCOPHAGE) 500 MG tablet TAKE ONE TABLET BY MOUTH TWICE A DAY WITH MEALS FOR DIABETES 60 tablet 3    simvastatin (ZOCOR) 20 MG tablet TAKE ONE TABLET BY MOUTH AT BEDTIME FOR CHOLESTEROL GENERIC ZOCOR 30 tablet 3    valsartan (DIOVAN) 160 MG tablet TAKE ONE TABLET BY MOUTH DAILY 30 tablet 5    vitamin E 100 UNIT capsule Take 100 Units by mouth twice a week.         Social History     Socioeconomic History    Marital status:    Tobacco Use    Smoking status: Never    Smokeless tobacco: Never   Substance and Sexual Activity    Alcohol use: Not Currently    Drug use: Never     Social Determinants of Health     Financial Resource Strain: Medium Risk (4/18/2024)    Overall Financial Resource Strain (CARDIA)     Difficulty of Paying Living Expenses: Somewhat hard   Food Insecurity: Food Insecurity Present (4/18/2024)    Hunger Vital Sign     Worried About Running Out of Food in the Last Year: Sometimes true     Ran Out of Food in the Last Year: Sometimes true   Transportation Needs: Unmet Transportation Needs (4/18/2024)    PRAPARE - Transportation     Lack of Transportation (Medical): Yes     Lack of Transportation (Non-Medical): Yes   Physical Activity: Inactive (4/18/2024)    Exercise Vital Sign     Days of Exercise per Week: 3 days     Minutes of Exercise per Session: 0 min   Stress: Stress Concern Present (4/18/2024)    [employer] - Occupational Stress Questionnaire     Feeling of Stress : To some extent   Housing Stability: Low Risk  (4/18/2024)    Housing Stability Vital Sign     Unable to Pay for Housing in the Last Year: No     Homeless in the Last Year: No        Family History   Problem Relation Name Age of Onset    Heart disease Father      Dementia Sister      Heart failure Sister          Patient Care Team:  Idania Phan MD as PCP - General (Internal Medicine)  Morgan Law MD (Cardiovascular Disease)  Shelli Garza LPN as Licensed  "Practical Nurse       Subjective:     Review of Systems   Constitutional: Negative.    HENT: Negative.     Eyes:         Last eye was about a year ago, she has needed a new prescription   Respiratory: Negative.     Cardiovascular:  Negative for chest pain and palpitations.        BP higher when she does her vitamins together.    Gastrointestinal: Negative.    Genitourinary:  Positive for frequency.        Urine has been 4 times at night still, she is off the HCT but it didn't change.    Musculoskeletal: Negative.    Neurological: Negative.    Endo/Heme/Allergies: Negative.         Sugar is usually good   Psychiatric/Behavioral: Negative.           Objective:     BP (!) 133/52   Pulse 79   Temp 98 °F (36.7 °C) (Oral)   Resp 15   Ht 5' 5" (1.651 m)   Wt 65.8 kg (145 lb)   SpO2 97%   BMI 24.13 kg/m²     Physical Exam  Vitals reviewed.   Constitutional:       Appearance: She is not ill-appearing.   HENT:      Head: Normocephalic.      Mouth/Throat:      Mouth: Mucous membranes are moist.      Pharynx: No oropharyngeal exudate or posterior oropharyngeal erythema.   Eyes:      General: No scleral icterus.     Extraocular Movements: Extraocular movements intact.      Conjunctiva/sclera: Conjunctivae normal.      Pupils: Pupils are equal, round, and reactive to light.   Neck:      Vascular: Carotid bruit present.   Cardiovascular:      Rate and Rhythm: Normal rate.      Pulses:           Dorsalis pedis pulses are 2+ on the right side and 3+ on the left side.        Posterior tibial pulses are 2+ on the right side and 2+ on the left side.      Heart sounds: Murmur heard.      Gallop present.   Pulmonary:      Effort: Pulmonary effort is normal.      Breath sounds: No rales.   Abdominal:      General: Bowel sounds are normal.      Palpations: Abdomen is soft.      Tenderness: There is no abdominal tenderness. There is no guarding.   Musculoskeletal:         General: No tenderness.      Cervical back: No tenderness.     "  Right lower leg: Edema present.      Left lower leg: Edema present.      Comments: Trace edema bilaterally   Feet:      Right foot:      Protective Sensation: 10 sites tested.  10 sites sensed.      Skin integrity: Callus present.      Toenail Condition: Right toenails are abnormally thick.      Left foot:      Protective Sensation: 10 sites tested.  10 sites sensed.      Skin integrity: Callus present.      Toenail Condition: Left toenails are abnormally thick.   Skin:     General: Skin is warm and dry.      Findings: No bruising or rash.   Neurological:      Mental Status: She is alert and oriented to person, place, and time.      Motor: No weakness.      Gait: Gait normal.   Psychiatric:         Mood and Affect: Mood normal.         Behavior: Behavior normal.         Thought Content: Thought content normal.         Judgment: Judgment normal.           A comprehensive HEALTH RISK ASSESSMENT was completed today. Results are summarized below:    There are NO EMOTIONAL/SOCIAL CONCERNS identified on today's screening for Social Isolation, Depression and Anxiety.    There are NO COGNITIVE FUNCTION CONCERNS identified on today's screening.  The following FUNCTIONAL AND/OR SAFETY CONCERNS were identified on today's screening for Physical Symptoms, Nutritional, Home Safety/Living Situation, Fall Risk, Activities of Daily Living, Independent Activities of Daily Living, Physical Activity,Timed Up and Go test and Whisper test::  *Patient reports PROBLEMS WITH BLADDER FUNCTION. (Do you have any problems with urination like going too frequently, trouble urinating, leakage or accidents?: (!) Yes)  *Patient reports she NEEDS ASSISTANCE WITH SOME INDEPENDENT ACTIVITIES OF DAILY LIVING. (Do you need help with phone, transportation, shopping, preparing meals, housework, laundry, meds, or managing money?: (!) Yes)  *Patient reports NO PREVENTIVE HOME HAZARD EVALUATION OR MODIFICATION. (Have you or someone else evaluated or modified  your home with additional safety features like handrails on all stairs, installed grab bars in the bathroom, secured loose rugs and ensured good lighting in all areas?: (!) No)    The patient reports NO OPIOID PRESCRIPTIONS. This was confirmed through medication reconciliation and the [facility] website.    The patient is NOT A TOBACCO USER.  The patient reports NO SIGNIFICANT ALCOHOL USE.     All Questions regarding food, transportation or housing were not answered today.    Assessment/Plan:     1. Medicare annual wellness visit, subsequent    2. Well controlled type 2 diabetes mellitus  Comments:  Recheck A1C today  Orders:  -     Comprehensive Metabolic Panel  -     Hemoglobin A1C  -     Lipid Panel  -     Urinalysis, Reflex to Urine Culture  -     Microalbumin/Creatinine Ratio, Urine    3. Essential hypertension  Comments:  Good, ok to try moving Diovan to morning, if that doesn't help I can add a fluid pill but would need to watch blood much more often on one.  Orders:  -     Comprehensive Metabolic Panel    4. Chronic kidney disease, stage 3a  -     CBC Auto Differential  -     Comprehensive Metabolic Panel    5. Bilateral carotid artery stenosis  Comments:  Dr Moe monitors, continue simvastatin  Overview:  <50% right, 50-69% Right internal    Orders:  -     Lipid Panel    6. Coronary artery disease involving native coronary artery of native heart without angina pectoris  Comments:  Has seen her heart doctor, he added 25 mg hydralazine to the 50 for total 75mg    7. Peripheral arterial disease  Comments:  Right DP is reduced today still but no symptoms and staying active continue simvastatin  Overview:  Arterial calcifications on xrays of knees seen in 2017      8. Anemia, macrocytic  -     CBC Auto Differential    9. Iron deficiency anemia due to chronic blood loss  Comments:  Last iron good, will add if CBC dropped this time  Orders:  -     CBC Auto Differential    10. Multinodular thyroid  Comments:  Will  recheck TSH  Orders:  -     TSH    11. Pyuria  -     Urinalysis, Reflex to Urine Culture           Medicare Annual Wellness and Personalized Prevention Plan:   Fall Risk + Home Safety + Hearing Impairment + Depression Screen + Opioid and Substance Abuse Screening + Cognitive Impairment Screen + Health Risk Assessment all reviewed.     Health Maintenance Topics with due status: Not Due       Topic Last Completion Date    Aspirin/Antiplatelet Therapy 06/20/2024      The patient's Health Maintenance was reviewed and the following appears to be due at this time:   Health Maintenance Due   Topic Date Due    Eye Exam  Never done    TETANUS VACCINE  Never done    DEXA Scan  Never done    Shingles Vaccine (1 of 2) Never done    RSV Vaccine (Age 60+ and Pregnant patients) (1 - 1-dose 60+ series) Never done    Pneumococcal Vaccines (Age 65+) (2 of 2 - PPSV23 or PCV20) 02/11/2021    Diabetes Urine Screening  08/22/2024    Foot Exam  08/22/2024    Lipid Panel  08/22/2024    Influenza Vaccine (1) 09/01/2024    COVID-19 Vaccine (4 - 2023-24 season) 09/01/2024    Hemoglobin A1c  09/04/2024       Advance Care Planning   I attest to discussing Advance Care Planning with patient and/or family member.  Education was provided including the importance of the Health Care Power of , Advance Directives, and/or [place] documentation.  The patient expressed understanding to the importance of this information and discussion.  Advance Care Planning     Date: 09/17/2024  Patient did not wish or was not able to name a surrogate decision maker or provide an Advance Care Plan.            Follow up in about 6 months (around 3/17/2025) for DM. In addition to their scheduled follow up, the patient has also been instructed to follow up on as needed basis.

## 2024-09-17 ENCOUNTER — CLINICAL SUPPORT (OUTPATIENT)
Dept: PRIMARY CARE CLINIC | Facility: CLINIC | Age: 84
End: 2024-09-17
Attending: INTERNAL MEDICINE
Payer: MEDICARE

## 2024-09-17 ENCOUNTER — OFFICE VISIT (OUTPATIENT)
Dept: PRIMARY CARE CLINIC | Facility: CLINIC | Age: 84
End: 2024-09-17
Payer: MEDICARE

## 2024-09-17 VITALS
SYSTOLIC BLOOD PRESSURE: 133 MMHG | RESPIRATION RATE: 15 BRPM | BODY MASS INDEX: 24.16 KG/M2 | WEIGHT: 145 LBS | HEIGHT: 65 IN | TEMPERATURE: 98 F | HEART RATE: 79 BPM | DIASTOLIC BLOOD PRESSURE: 52 MMHG | OXYGEN SATURATION: 97 %

## 2024-09-17 DIAGNOSIS — I25.10 CORONARY ARTERY DISEASE INVOLVING NATIVE CORONARY ARTERY OF NATIVE HEART WITHOUT ANGINA PECTORIS: ICD-10-CM

## 2024-09-17 DIAGNOSIS — Z00.00 MEDICARE ANNUAL WELLNESS VISIT, SUBSEQUENT: Primary | ICD-10-CM

## 2024-09-17 DIAGNOSIS — E11.9 WELL CONTROLLED TYPE 2 DIABETES MELLITUS: Primary | ICD-10-CM

## 2024-09-17 DIAGNOSIS — N18.31 CHRONIC KIDNEY DISEASE, STAGE 3A: ICD-10-CM

## 2024-09-17 DIAGNOSIS — I10 ESSENTIAL HYPERTENSION: ICD-10-CM

## 2024-09-17 DIAGNOSIS — D53.9 ANEMIA, MACROCYTIC: ICD-10-CM

## 2024-09-17 DIAGNOSIS — I73.9 PERIPHERAL ARTERIAL DISEASE: ICD-10-CM

## 2024-09-17 DIAGNOSIS — E04.2 MULTINODULAR THYROID: ICD-10-CM

## 2024-09-17 DIAGNOSIS — I65.23 BILATERAL CAROTID ARTERY STENOSIS: ICD-10-CM

## 2024-09-17 DIAGNOSIS — R82.81 PYURIA: ICD-10-CM

## 2024-09-17 DIAGNOSIS — D50.0 IRON DEFICIENCY ANEMIA DUE TO CHRONIC BLOOD LOSS: ICD-10-CM

## 2024-09-17 DIAGNOSIS — E11.9 WELL CONTROLLED TYPE 2 DIABETES MELLITUS: ICD-10-CM

## 2024-09-17 PROCEDURE — 92228 IMG RTA DETC/MNTR DS PHY/QHP: CPT | Mod: 26,,, | Performed by: OPTOMETRIST

## 2024-09-17 NOTE — Clinical Note
OD photo too dark.  OS photo too blurry to read.  Photos should be taken, or pt scheduled for next available eye exam

## 2024-09-17 NOTE — PROGRESS NOTES
Lynda Mendoza is a 83 y.o. female here for a diabetic eye screening with non-dilated fundus photos per Dr. Phan.    Patient cooperative?: Yes  Small pupils?: Yes  Last eye exam: N/A    For exam results, see Encounter Report.

## 2024-09-18 ENCOUNTER — TELEPHONE (OUTPATIENT)
Dept: PRIMARY CARE CLINIC | Facility: CLINIC | Age: 84
End: 2024-09-18
Payer: MEDICARE

## 2024-09-18 LAB
ALBUMIN SERPL-MCNC: 4.1 G/DL (ref 3.7–4.7)
ALBUMIN/CREAT UR: NORMAL MG/G CREAT (ref 0–29)
ALP SERPL-CCNC: 90 IU/L (ref 44–121)
ALT SERPL-CCNC: 7 IU/L (ref 0–32)
AST SERPL-CCNC: 17 IU/L (ref 0–40)
BASOPHILS # BLD AUTO: 0 X10E3/UL (ref 0–0.2)
BASOPHILS NFR BLD AUTO: 1 %
BILIRUB SERPL-MCNC: 0.2 MG/DL (ref 0–1.2)
BUN SERPL-MCNC: 25 MG/DL (ref 8–27)
BUN/CREAT SERPL: 23 (ref 12–28)
CALCIUM SERPL-MCNC: 9.3 MG/DL (ref 8.7–10.3)
CHLORIDE SERPL-SCNC: 100 MMOL/L (ref 96–106)
CHOLEST SERPL-MCNC: 161 MG/DL (ref 100–199)
CO2 SERPL-SCNC: 21 MMOL/L (ref 20–29)
CREAT SERPL-MCNC: 1.07 MG/DL (ref 0.57–1)
CREAT UR-MCNC: 129.9 MG/DL
EOSINOPHIL # BLD AUTO: 0.2 X10E3/UL (ref 0–0.4)
EOSINOPHIL NFR BLD AUTO: 2 %
ERYTHROCYTE [DISTWIDTH] IN BLOOD BY AUTOMATED COUNT: 15.2 % (ref 11.7–15.4)
EST. GFR  (NO RACE VARIABLE): 51 ML/MIN/1.73
GLOBULIN SER CALC-MCNC: 3 G/DL (ref 1.5–4.5)
GLUCOSE SERPL-MCNC: 87 MG/DL (ref 70–99)
HBA1C MFR BLD: 6.1 % (ref 4.8–5.6)
HCT VFR BLD AUTO: 33.1 % (ref 34–46.6)
HDLC SERPL-MCNC: 81 MG/DL
HGB BLD-MCNC: 10 G/DL (ref 11.1–15.9)
IMM GRANULOCYTES NFR BLD AUTO: 1 %
LDLC SERPL CALC-MCNC: 67 MG/DL (ref 0–99)
LYMPHOCYTES # BLD AUTO: 1.7 X10E3/UL (ref 0.7–3.1)
LYMPHOCYTES NFR BLD AUTO: 20 %
MCH RBC QN AUTO: 29.3 PG (ref 26.6–33)
MCHC RBC AUTO-ENTMCNC: 30.2 G/DL (ref 31.5–35.7)
MCV RBC AUTO: 97 FL (ref 79–97)
MICROALBUMIN UR-MCNC: <12 UG/ML
MONOCYTES # BLD AUTO: 1 X10E3/UL (ref 0.1–0.9)
MONOCYTES NFR BLD AUTO: 12 %
MORPHOLOGY BLD-IMP: ABNORMAL
NEUTROPHILS # BLD AUTO: 5.6 X10E3/UL (ref 1.4–7)
NEUTROPHILS NFR BLD AUTO: 64 %
PLATELET # BLD AUTO: 279 X10E3/UL (ref 150–450)
POTASSIUM SERPL-SCNC: 4.6 MMOL/L (ref 3.5–5.2)
PROT SERPL-MCNC: 7.1 G/DL (ref 6–8.5)
RBC # BLD AUTO: 3.41 X10E6/UL (ref 3.77–5.28)
SODIUM SERPL-SCNC: 139 MMOL/L (ref 134–144)
TRIGL SERPL-MCNC: 65 MG/DL (ref 0–149)
TSH SERPL DL<=0.005 MIU/L-ACNC: 2.23 UIU/ML (ref 0.45–4.5)
VLDLC SERPL CALC-MCNC: 13 MG/DL (ref 5–40)
WBC # BLD AUTO: 8.6 X10E3/UL (ref 3.4–10.8)

## 2024-09-18 NOTE — TELEPHONE ENCOUNTER
----- Message from Idania Phan MD sent at 9/17/2024  5:11 PM CDT -----  She needs to go to the eye doctor, it didn't show what they need to see in the back of the eye

## 2024-09-19 ENCOUNTER — TELEPHONE (OUTPATIENT)
Dept: PRIMARY CARE CLINIC | Facility: CLINIC | Age: 84
End: 2024-09-19
Payer: MEDICARE

## 2024-09-19 LAB
APPEARANCE UR: CLEAR
BACTERIA #/AREA URNS HPF: NORMAL /[HPF]
BACTERIA UR CULT: NORMAL
BACTERIA UR CULT: NORMAL
BILIRUB UR QL STRIP: NEGATIVE
COLOR UR: YELLOW
CRYSTALS URNS MICRO: NORMAL
EPI CELLS #/AREA URNS HPF: NORMAL /HPF (ref 0–10)
GLUCOSE UR QL STRIP: NEGATIVE
HGB UR QL STRIP: NEGATIVE
KETONES UR QL STRIP: NEGATIVE
LEUKOCYTE ESTERASE UR QL STRIP: ABNORMAL
MICRO URNS: ABNORMAL
NITRITE UR QL STRIP: NEGATIVE
PH UR STRIP: 5.5 [PH] (ref 5–7.5)
PROT UR QL STRIP: NEGATIVE
RBC #/AREA URNS HPF: NORMAL /HPF (ref 0–2)
SP GR UR STRIP: 1.02 (ref 1–1.03)
URINALYSIS REFLEX: ABNORMAL
UROBILINOGEN UR STRIP-MCNC: 0.2 MG/DL (ref 0.2–1)
WBC #/AREA URNS HPF: NORMAL /HPF (ref 0–5)

## 2024-09-19 NOTE — TELEPHONE ENCOUNTER
----- Message from Idania Phan MD sent at 9/18/2024  4:53 PM CDT -----  Her anemia is about the same. Her kidney function is up a little but she looks dry and it's where she often is too so stable. The diabetes is great, keep it up. Cholesterol is great, keep it up. Copy to Dr Moe  Thyroid is ok but varun, if energy drops then we'll check again early

## 2024-10-10 ENCOUNTER — TELEPHONE (OUTPATIENT)
Dept: PRIMARY CARE CLINIC | Facility: CLINIC | Age: 84
End: 2024-10-10

## 2024-10-10 RX ORDER — AMLODIPINE BESYLATE 10 MG/1
TABLET ORAL
Qty: 30 TABLET | Refills: 3 | Status: SHIPPED | OUTPATIENT
Start: 2024-10-10

## 2024-10-10 RX ORDER — CARVEDILOL 12.5 MG/1
TABLET ORAL
Qty: 60 TABLET | Refills: 3 | Status: SHIPPED | OUTPATIENT
Start: 2024-10-10

## 2024-10-10 RX ORDER — METFORMIN HYDROCHLORIDE 500 MG/1
TABLET ORAL
Qty: 60 TABLET | Refills: 3 | Status: SHIPPED | OUTPATIENT
Start: 2024-10-10

## 2024-10-10 RX ORDER — SIMVASTATIN 20 MG/1
TABLET, FILM COATED ORAL
Qty: 30 TABLET | Refills: 3 | Status: SHIPPED | OUTPATIENT
Start: 2024-10-10

## 2024-10-10 RX ORDER — CLOPIDOGREL BISULFATE 75 MG/1
TABLET ORAL
Qty: 30 TABLET | Refills: 3 | Status: SHIPPED | OUTPATIENT
Start: 2024-10-10

## 2024-11-07 RX ORDER — VALSARTAN 160 MG/1
160 TABLET ORAL
Qty: 30 TABLET | Refills: 5 | Status: SHIPPED | OUTPATIENT
Start: 2024-11-07

## 2025-02-03 RX ORDER — SIMVASTATIN 20 MG/1
TABLET, FILM COATED ORAL
Qty: 30 TABLET | Refills: 3 | Status: SHIPPED | OUTPATIENT
Start: 2025-02-03

## 2025-02-03 RX ORDER — AMLODIPINE BESYLATE 10 MG/1
TABLET ORAL
Qty: 30 TABLET | Refills: 3 | Status: SHIPPED | OUTPATIENT
Start: 2025-02-03

## 2025-02-03 RX ORDER — CARVEDILOL 12.5 MG/1
TABLET ORAL
Qty: 60 TABLET | Refills: 3 | Status: SHIPPED | OUTPATIENT
Start: 2025-02-03

## 2025-02-03 RX ORDER — CLOPIDOGREL BISULFATE 75 MG/1
TABLET ORAL
Qty: 30 TABLET | Refills: 3 | Status: SHIPPED | OUTPATIENT
Start: 2025-02-03

## 2025-02-03 RX ORDER — METFORMIN HYDROCHLORIDE 500 MG/1
TABLET ORAL
Qty: 60 TABLET | Refills: 3 | Status: SHIPPED | OUTPATIENT
Start: 2025-02-03

## 2025-03-13 ENCOUNTER — TELEPHONE (OUTPATIENT)
Dept: PRIMARY CARE CLINIC | Facility: CLINIC | Age: 85
End: 2025-03-13
Payer: MEDICARE

## 2025-05-01 RX ORDER — VALSARTAN 160 MG/1
160 TABLET ORAL
Qty: 30 TABLET | Refills: 0 | Status: SHIPPED | OUTPATIENT
Start: 2025-05-01

## 2025-05-02 ENCOUNTER — TELEPHONE (OUTPATIENT)
Dept: PRIMARY CARE CLINIC | Facility: CLINIC | Age: 85
End: 2025-05-02
Payer: MEDICARE

## 2025-05-02 NOTE — TELEPHONE ENCOUNTER
He notes she gets a lot os scam callers and must not have our number in her caller ID. She relies on it. He has told her to call and schedule here. She is complaining of her feet swelling and he's concerned it is her amlodipine. He found her cognition normal on the call.

## 2025-05-02 NOTE — TELEPHONE ENCOUNTER
Copied from CRM #[id number]. Topic: Medications - Pharmacy  >> May 2, 2025 11:40 AM Sandra wrote:  .Who Called: [place] pharmacist     Pharmacy is calling to request assistance with Rx    Pharmacy name and phone number: [facility]   Pharmacy contact: [place]   Patient Name: hermelinda pitts  Prescription Name:  valsartan (DIOVAN) 160 MG tablet  What do they need to clarify?:take about refill on med and phone calls         Preferred Method of Contact: Phone Call  Patient's Preferred Phone Number on File: [phone]  Best Call Back Number, if different:  Additional Information:

## 2025-05-02 NOTE — TELEPHONE ENCOUNTER
I reached out and some one picked up the phone and said who is this and how did you get this number. I explained who I was and they hung up.

## 2025-05-13 ENCOUNTER — TELEPHONE (OUTPATIENT)
Dept: PRIMARY CARE CLINIC | Facility: CLINIC | Age: 85
End: 2025-05-13
Payer: MEDICARE

## 2025-05-14 ENCOUNTER — OFFICE VISIT (OUTPATIENT)
Dept: PRIMARY CARE CLINIC | Facility: CLINIC | Age: 85
End: 2025-05-14
Payer: MEDICARE

## 2025-05-14 VITALS
HEIGHT: 65 IN | RESPIRATION RATE: 16 BRPM | WEIGHT: 151.38 LBS | TEMPERATURE: 98 F | DIASTOLIC BLOOD PRESSURE: 70 MMHG | HEART RATE: 80 BPM | OXYGEN SATURATION: 16 % | BODY MASS INDEX: 25.22 KG/M2 | SYSTOLIC BLOOD PRESSURE: 164 MMHG

## 2025-05-14 DIAGNOSIS — E11.9 WELL CONTROLLED TYPE 2 DIABETES MELLITUS: Primary | ICD-10-CM

## 2025-05-14 DIAGNOSIS — I25.10 CORONARY ARTERY DISEASE INVOLVING NATIVE CORONARY ARTERY OF NATIVE HEART WITHOUT ANGINA PECTORIS: ICD-10-CM

## 2025-05-14 DIAGNOSIS — I10 UNCONTROLLED HYPERTENSION: ICD-10-CM

## 2025-05-14 DIAGNOSIS — R60.0 PEDAL EDEMA: ICD-10-CM

## 2025-05-14 DIAGNOSIS — N18.31 CHRONIC KIDNEY DISEASE, STAGE 3A: ICD-10-CM

## 2025-05-14 LAB
BUN SERPL-MCNC: 20 MG/DL (ref 8–27)
BUN/CREAT SERPL: 24 (ref 12–28)
CALCIUM SERPL-MCNC: 9.3 MG/DL (ref 8.7–10.3)
CHLORIDE SERPL-SCNC: 106 MMOL/L (ref 96–106)
CO2 SERPL-SCNC: 23 MMOL/L (ref 20–29)
CREAT SERPL-MCNC: 0.84 MG/DL (ref 0.57–1)
EST. GFR  (NO RACE VARIABLE): 68 ML/MIN/1.73
GLUCOSE SERPL-MCNC: 81 MG/DL (ref 70–99)
HBA1C MFR BLD: 6.2 % (ref 4.8–5.6)
POTASSIUM SERPL-SCNC: 4.2 MMOL/L (ref 3.5–5.2)
SODIUM SERPL-SCNC: 141 MMOL/L (ref 134–144)

## 2025-05-14 PROCEDURE — 1101F PT FALLS ASSESS-DOCD LE1/YR: CPT | Mod: CPTII,,, | Performed by: INTERNAL MEDICINE

## 2025-05-14 PROCEDURE — 1160F RVW MEDS BY RX/DR IN RCRD: CPT | Mod: CPTII,,, | Performed by: INTERNAL MEDICINE

## 2025-05-14 PROCEDURE — 1159F MED LIST DOCD IN RCRD: CPT | Mod: CPTII,,, | Performed by: INTERNAL MEDICINE

## 2025-05-14 PROCEDURE — 99214 OFFICE O/P EST MOD 30 MIN: CPT | Mod: ,,, | Performed by: INTERNAL MEDICINE

## 2025-05-14 PROCEDURE — 36415 COLL VENOUS BLD VENIPUNCTURE: CPT | Mod: ,,, | Performed by: INTERNAL MEDICINE

## 2025-05-14 PROCEDURE — 3288F FALL RISK ASSESSMENT DOCD: CPT | Mod: CPTII,,, | Performed by: INTERNAL MEDICINE

## 2025-05-14 PROCEDURE — 1126F AMNT PAIN NOTED NONE PRSNT: CPT | Mod: CPTII,,, | Performed by: INTERNAL MEDICINE

## 2025-05-14 RX ORDER — AMLODIPINE BESYLATE 10 MG/1
10 TABLET ORAL DAILY
COMMUNITY
Start: 2025-05-01

## 2025-05-14 RX ORDER — HYDROCHLOROTHIAZIDE 12.5 MG/1
12.5 TABLET ORAL DAILY PRN
Qty: 30 TABLET | Refills: 11 | Status: SHIPPED | OUTPATIENT
Start: 2025-05-14

## 2025-05-14 RX ORDER — MULTIVIT WITH MINERALS/HERBS
1 TABLET ORAL DAILY
COMMUNITY

## 2025-05-14 NOTE — PROGRESS NOTES
Idania Phan MD   [address] [place] [address]     Patient ID: [id number]     Chief Complaint: Foot Swelling        HPI:     Lynda Mendoza is a 84 y.o. female here today for a follow up of diabetes and CAD. She is reporting edema. She missed her follow up visit in March and has been hanging up on my staff when they tried to reschedule. We contacted her pharmacist and he was not having any cognitive decline noted and told her to call for a visit.  She notes she swells every summer. She was blaming the amlodipine but now she thinks it's something else. She also had ant bites after stepping on an ant pile. She did alcohol and cleaned them. Next she put hydrocortisone on it and swelling is slowly improving. No other complaints today.       Subjective:     Review of Systems   Respiratory: Negative.     Cardiovascular: Negative.    Skin:  Positive for itching.        Bites are getting better   Neurological: Negative.         Memory is stable       Past Medical History:   Diagnosis Date    Acute renal failure     Bilateral carotid artery stenosis 08/21/2023    <50% right, 50-69% Right internal    CAD (coronary artery disease)     Chronic kidney disease (CKD)     Diabetes mellitus, type 2     Diverticulosis     HTN (hypertension)     Iron deficiency     Macrocytic anemia     MI (myocardial infarction)         Past Surgical History:   Procedure Laterality Date    BREAST LUMPECTOMY Left     NASAL SEPTOPLASTY      SKIN TAG REMOVAL      SURGICAL REMOVAL OF NODULE OF VOCAL CORD         Family History   Problem Relation Name Age of Onset    Heart disease Father      Dementia Sister      Heart failure Sister          Social History[1]    Review of patient's allergies indicates:   Allergen Reactions    Penicillins        Outpatient Medications Marked as Taking for the 5/14/25 encounter (Office Visit) with Idania Phan MD   Medication Sig Dispense Refill    amLODIPine (NORVASC) 10 MG tablet Take 10 mg by mouth once daily.       "aspirin (ECOTRIN) 81 MG EC tablet Take 81 mg by mouth once daily.      b complex vitamins tablet Take 1 tablet by mouth once daily.      cholecalciferol, vitamin D3, (VITAMIN D3 ORAL) Take 100 mcg by mouth 3 (three) times a week.      clopidogreL (PLAVIX) 75 mg tablet TAKE ONE TABLET BY MOUTH EVERY DAY FOR BLOOD GENERIC PLAVIX 30 tablet 3    hydrALAZINE (APRESOLINE) 25 MG tablet Take 25 mg by mouth 3 (three) times daily. Taking 75 mg tid      hydrALAZINE (APRESOLINE) 50 MG tablet Take 50 mg by mouth 3 (three) times daily. TAKING WITH THE 25MG      metFORMIN (GLUCOPHAGE) 500 MG tablet TAKE ONE TABLET BY MOUTH TWICE A DAY WITH MEALS FOR DIABETES 60 tablet 3    simvastatin (ZOCOR) 20 MG tablet TAKE ONE TABLET BY MOUTH AT BEDTIME FOR CHOLESTEROL GENERIC ZOCOR 30 tablet 3    valsartan (DIOVAN) 160 MG tablet TAKE ONE TABLET BY MOUTH DAILY 30 tablet 0    vitamin E 100 UNIT capsule Take 100 Units by mouth twice a week.         Patient Care Team:  Idania Phan MD as PCP - General (Internal Medicine)  Morgan Law MD (Cardiovascular Disease)  Shelli Garza LPN as Licensed Practical Nurse       Objective:     BP (!) 164/70   Pulse 80   Temp 98.3 °F (36.8 °C) (Oral)   Resp 16   Ht 5' 5" (1.651 m)   Wt 68.7 kg (151 lb 6.4 oz)   SpO2 (!) 16%   BMI 25.19 kg/m²     Physical Exam  Vitals reviewed.   Cardiovascular:      Rate and Rhythm: Normal rate and regular rhythm.   Pulmonary:      Effort: Pulmonary effort is normal.      Breath sounds: Normal breath sounds.   Skin:     General: Skin is warm and dry.      Findings: No erythema.   Neurological:      Mental Status: She is alert.   Psychiatric:         Mood and Affect: Mood normal.         Behavior: Behavior normal.         Thought Content: Thought content normal.         Judgment: Judgment normal.               Assessment/Plan:     1. Well controlled type 2 diabetes mellitus  -     Cancel: Hemoglobin A1C, POCT  -     Basic Metabolic Panel  -     Hemoglobin " A1C    2. Uncontrolled hypertension  Comments:  She finds valsartan makes her urinate too much at night, suspect she skips, ok to move to AM    3. Chronic kidney disease, stage 3a  Comments:  Will try to get blood today    4. Pedal edema  Comments:  Consider change from Amlodipine to Nifedipine  Orders:  -     Basic Metabolic Panel    5. Coronary artery disease involving native coronary artery of native heart without angina pectoris  Comments:  She hasn't been to Dr Moe lately, hopefully she wasn't hanging up on his office as well    Other orders  -     hydroCHLOROthiazide 12.5 MG Tab; Take 1 tablet (12.5 mg total) by mouth daily as needed (swelling).  Dispense: 30 tablet; Refill: 11     She refuses to sign for me to get records from Dr Moe. I explained it was helpful for me to know what he's thinking so I don't do anything that interferes with his treatments.         Follow up in about 5 months (around 9/30/2025) for Wellness. In addition to their scheduled follow up, the patient has also been instructed to follow up on as needed basis.     Signature:  Idania Phan MD  [facility]  [address] [address]         [1]   Social History  Socioeconomic History    Marital status:    Tobacco Use    Smoking status: Never    Smokeless tobacco: Never   Substance and Sexual Activity    Alcohol use: Not Currently    Drug use: Never     Social Drivers of Health     Financial Resource Strain: Low Risk  (5/14/2025)    Overall Financial Resource Strain (CARDIA)     Difficulty of Paying Living Expenses: Not hard at all   Food Insecurity: No Food Insecurity (5/14/2025)    Hunger Vital Sign     Worried About Running Out of Food in the Last Year: Never true     Ran Out of Food in the Last Year: Never true   Transportation Needs: No Transportation Needs (5/14/2025)    PRAPARE - Transportation     Lack of Transportation (Medical): No     Lack of Transportation (Non-Medical): No   Physical Activity:  Inactive (5/14/2025)    Exercise Vital Sign     Days of Exercise per Week: 0 days     Minutes of Exercise per Session: 0 min   Stress: No Stress Concern Present (5/14/2025)    [employer] - Occupational Stress Questionnaire     Feeling of Stress : Not at all   Housing Stability: Low Risk  (5/14/2025)    Housing Stability Vital Sign     Unable to Pay for Housing in the Last Year: No     Number of Times Moved in the Last Year: 0     Homeless in the Last Year: No

## 2025-05-15 ENCOUNTER — TELEPHONE (OUTPATIENT)
Dept: PRIMARY CARE CLINIC | Facility: CLINIC | Age: 85
End: 2025-05-15
Payer: MEDICARE

## 2025-05-15 ENCOUNTER — RESULTS FOLLOW-UP (OUTPATIENT)
Dept: PRIMARY CARE CLINIC | Facility: CLINIC | Age: 85
End: 2025-05-15

## 2025-05-15 PROBLEM — N18.2 CHRONIC KIDNEY DISEASE, STAGE 2 (MILD): Status: ACTIVE | Noted: 2023-08-21

## 2025-05-15 NOTE — TELEPHONE ENCOUNTER
----- Message from Idania Phan MD sent at 5/15/2025  5:02 PM CDT -----  Lab looks good, kidney function is better and sugars are still doing great. Keep it up!  ----- Message -----  From: Pearl Mata  Sent: 5/14/2025  10:08 PM CDT  To: Idania Phan MD

## 2025-05-28 RX ORDER — AMLODIPINE BESYLATE 10 MG/1
TABLET ORAL
Qty: 30 TABLET | Refills: 4 | Status: SHIPPED | OUTPATIENT
Start: 2025-05-28

## 2025-05-28 RX ORDER — METFORMIN HYDROCHLORIDE 500 MG/1
TABLET ORAL
Qty: 60 TABLET | Refills: 4 | Status: SHIPPED | OUTPATIENT
Start: 2025-05-28

## 2025-05-28 RX ORDER — VALSARTAN 160 MG/1
160 TABLET ORAL
Qty: 30 TABLET | Refills: 4 | Status: SHIPPED | OUTPATIENT
Start: 2025-05-28

## 2025-05-28 RX ORDER — CLOPIDOGREL BISULFATE 75 MG/1
TABLET ORAL
Qty: 30 TABLET | Refills: 4 | Status: SHIPPED | OUTPATIENT
Start: 2025-05-28

## 2025-05-28 RX ORDER — SIMVASTATIN 20 MG/1
TABLET, FILM COATED ORAL
Qty: 30 TABLET | Refills: 4 | Status: SHIPPED | OUTPATIENT
Start: 2025-05-28

## 2025-05-28 RX ORDER — CARVEDILOL 12.5 MG/1
TABLET ORAL
Qty: 60 TABLET | Refills: 4 | Status: SHIPPED | OUTPATIENT
Start: 2025-05-28

## 2025-07-14 ENCOUNTER — TELEPHONE (OUTPATIENT)
Dept: PRIMARY CARE CLINIC | Facility: CLINIC | Age: 85
End: 2025-07-14
Payer: MEDICARE

## 2025-07-14 NOTE — TELEPHONE ENCOUNTER
Spoke to patient was concern about taking the hydrochlorothiazide also mention she though it was in her [place] informed it isn't voices understanding, will continue take it as ordered.

## 2025-07-14 NOTE — TELEPHONE ENCOUNTER
Copied from CRM #[id number]. Topic: Medications - Medication Question  >> Jul 11, 2025 11:38 AM Gayle wrote:  Type:  Pharmacy Calling to Clarify an RX    Name of Caller:  Matt    Pharmacy Name: [facility]    Prescription Name:  hydroCHLOROthiazide 12.5 MG Tab    What do they need to clarify?:  pt asking if she can stop taking the above RX, states it makes her feel bad    Best Call Back Number: [phone]    Additional Information:  please advise pt

## 2025-07-22 ENCOUNTER — OFFICE VISIT (OUTPATIENT)
Dept: PRIMARY CARE CLINIC | Facility: CLINIC | Age: 85
End: 2025-07-22
Payer: MEDICARE

## 2025-07-22 VITALS
RESPIRATION RATE: 15 BRPM | OXYGEN SATURATION: 99 % | SYSTOLIC BLOOD PRESSURE: 147 MMHG | DIASTOLIC BLOOD PRESSURE: 65 MMHG | HEIGHT: 65 IN | BODY MASS INDEX: 25.49 KG/M2 | HEART RATE: 82 BPM | WEIGHT: 153 LBS | TEMPERATURE: 98 F

## 2025-07-22 DIAGNOSIS — I10 UNCONTROLLED HYPERTENSION: ICD-10-CM

## 2025-07-22 DIAGNOSIS — I25.10 CORONARY ARTERY DISEASE INVOLVING NATIVE CORONARY ARTERY OF NATIVE HEART WITHOUT ANGINA PECTORIS: ICD-10-CM

## 2025-07-22 DIAGNOSIS — E11.9 WELL CONTROLLED TYPE 2 DIABETES MELLITUS: ICD-10-CM

## 2025-07-22 DIAGNOSIS — R60.0 PEDAL EDEMA: Primary | ICD-10-CM

## 2025-07-22 PROCEDURE — 1159F MED LIST DOCD IN RCRD: CPT | Mod: CPTII,,, | Performed by: INTERNAL MEDICINE

## 2025-07-22 PROCEDURE — 1160F RVW MEDS BY RX/DR IN RCRD: CPT | Mod: CPTII,,, | Performed by: INTERNAL MEDICINE

## 2025-07-22 PROCEDURE — 99214 OFFICE O/P EST MOD 30 MIN: CPT | Mod: ,,, | Performed by: INTERNAL MEDICINE

## 2025-07-22 PROCEDURE — 1126F AMNT PAIN NOTED NONE PRSNT: CPT | Mod: CPTII,,, | Performed by: INTERNAL MEDICINE

## 2025-07-22 RX ORDER — INDAPAMIDE 2.5 MG/1
2.5 TABLET ORAL DAILY
Qty: 30 TABLET | Refills: 5 | Status: SHIPPED | OUTPATIENT
Start: 2025-07-22

## 2025-07-22 RX ORDER — PNV NO.95/FERROUS FUM/FOLIC AC 28MG-0.8MG
100 TABLET ORAL
COMMUNITY

## 2025-07-22 NOTE — PROGRESS NOTES
Idania Phan MD   [address] [address]     Patient ID: [id number]     Chief Complaint: Edema (Both foot swelling.)        HPI:     Lynda Mendoza is a 84 y.o. female here today for swelling in her feet. It started with the ants biting her. She started soaking in Epsom and using she did Triple Antibiotic. No other complaints today.       Subjective:     Review of Systems   Respiratory: Negative.     Cardiovascular:         Has not been to Dr Moe in awhile.       Past Medical History:   Diagnosis Date    Acute renal failure     Bilateral carotid artery stenosis 08/21/2023    Left Carotid 70-75% on 5/20/24    CAD (coronary artery disease)     Chronic kidney disease (CKD)     CVA (cerebral vascular accident)     Left side weakness and speech difficulty    Diabetes mellitus, type 2     Diverticulosis     HTN (hypertension)     Iron deficiency     Macrocytic anemia     MI (myocardial infarction) 2012    Thyroid disease         Past Surgical History:   Procedure Laterality Date    BREAST LUMPECTOMY Left     CORONARY STENT PLACEMENT  2012    NASAL SEPTOPLASTY      SKIN TAG REMOVAL      SURGICAL REMOVAL OF NODULE OF VOCAL CORD         Family History   Problem Relation Name Age of Onset    Hypertension Mother      Heart disease Father      Diabetes Father      Hypertension Father      Dementia Sister      Heart failure Sister          Social History[1]    Review of patient's allergies indicates:   Allergen Reactions    Penicillins        Outpatient Medications Marked as Taking for the 7/22/25 encounter (Office Visit) with Idania Phan MD   Medication Sig Dispense Refill    amLODIPine (NORVASC) 10 MG tablet TAKE ONE TABLET BY MOUTH EVERY DAY FOR BLOOD PRESSURE NORVASC GENERIC 30 tablet 4    aspirin (ECOTRIN) 81 MG EC tablet Take 81 mg by mouth once daily.      b complex vitamins tablet Take 1 tablet by mouth once daily.      carvediloL (COREG) 12.5 MG tablet TAKE ONE TABLET BY MOUTH TWICE A DAY GENERIC COREG 60  "tablet 4    cholecalciferol, vitamin D3, (VITAMIN D3 ORAL) Take 100 mcg by mouth 3 (three) times a week.      clopidogreL (PLAVIX) 75 mg tablet TAKE ONE TABLET BY MOUTH EVERY DAY FOR BLOOD GENERIC PLAVIX 30 tablet 4    cyanocobalamin (VITAMIN B-12) 100 MCG tablet Take 100 mcg by mouth. Taking 1 for 3 days a week      hydrALAZINE (APRESOLINE) 25 MG tablet Take 25 mg by mouth 3 (three) times daily. Taking 75 mg tid      hydrALAZINE (APRESOLINE) 50 MG tablet Take 50 mg by mouth 3 (three) times daily. TAKING WITH THE 25MG      metFORMIN (GLUCOPHAGE) 500 MG tablet TAKE ONE TABLET BY MOUTH TWICE A DAY WITH MEALS FOR DIABETES 60 tablet 4    simvastatin (ZOCOR) 20 MG tablet TAKE ONE TABLET BY MOUTH AT BEDTIME FOR CHOLESTEROL GENERIC ZOCOR 30 tablet 4    valsartan (DIOVAN) 160 MG tablet TAKE ONE TABLET BY MOUTH DAILY 30 tablet 4    vitamin E 100 UNIT capsule Take 100 Units by mouth 3 (three) times a week.      [DISCONTINUED] hydroCHLOROthiazide 12.5 MG Tab Take 1 tablet (12.5 mg total) by mouth daily as needed (swelling). 30 tablet 11       Patient Care Team:  Idania Phan MD as PCP - General (Internal Medicine)  Morgan Law MD (Cardiovascular Disease)  Shelli Garza LPN as Licensed Practical Nurse       Objective:     BP (!) 147/65   Pulse 82   Temp 98.1 °F (36.7 °C) (Oral)   Resp 15   Ht 5' 5" (1.651 m)   Wt 69.4 kg (153 lb)   SpO2 99%   BMI 25.46 kg/m²     Physical Exam  Vitals reviewed.   Cardiovascular:      Rate and Rhythm: Normal rate.      Heart sounds:      No gallop.   Pulmonary:      Breath sounds: No rales.   Musculoskeletal:      Right lower leg: Edema present.      Left lower leg: Edema present.      Comments: 2-3 + edema to mid calf   Skin:     Comments: There is no redness or warmth, the ant bites are resolved without any pigment changes.    Neurological:      Mental Status: She is alert.               Assessment/Plan:     1. Pedal edema  Comments:  Change to Indapamide    2. " Uncontrolled hypertension  Comments:  Indapimide should help lower, will be back in Sept for annual will do lab then    3. Coronary artery disease involving native coronary artery of native heart without angina pectoris  Comments:  I'd like her to get back with Dr Moe to check an ECHO    4. Well controlled type 2 diabetes mellitus    Other orders  -     indapamide (LOZOL) 2.5 MG Tab; Take 1 tablet (2.5 mg total) by mouth once daily.  Dispense: 30 tablet; Refill: 5             Follow up for Wellness. In addition to their scheduled follow up, the patient has also been instructed to follow up on as needed basis.     Signature:  Idania Phan MD  [facility]  [address] [place] [address]         [1]   Social History  Socioeconomic History    Marital status:    Tobacco Use    Smoking status: Never    Smokeless tobacco: Never   Substance and Sexual Activity    Alcohol use: Not Currently    Drug use: Never     Social Drivers of Health     Financial Resource Strain: Low Risk  (5/14/2025)    Overall Financial Resource Strain (CARDIA)     Difficulty of Paying Living Expenses: Not hard at all   Food Insecurity: No Food Insecurity (5/14/2025)    Hunger Vital Sign     Worried About Running Out of Food in the Last Year: Never true     Ran Out of Food in the Last Year: Never true   Transportation Needs: No Transportation Needs (5/14/2025)    PRAPARE - Transportation     Lack of Transportation (Medical): No     Lack of Transportation (Non-Medical): No   Physical Activity: Inactive (5/14/2025)    Exercise Vital Sign     Days of Exercise per Week: 0 days     Minutes of Exercise per Session: 0 min   Stress: No Stress Concern Present (5/14/2025)    [place] [facility] - Occupational Stress Questionnaire     Feeling of Stress : Not at all   Housing Stability: Low Risk  (5/14/2025)    Housing Stability Vital Sign     Unable to Pay for Housing in the Last Year: No     Number of Times Moved in the  Last Year: 0     Homeless in the Last Year: No

## 2025-08-13 ENCOUNTER — TELEPHONE (OUTPATIENT)
Dept: PRIMARY CARE CLINIC | Facility: CLINIC | Age: 85
End: 2025-08-13
Payer: MEDICARE